# Patient Record
Sex: FEMALE | Race: WHITE | Employment: UNEMPLOYED | URBAN - METROPOLITAN AREA
[De-identification: names, ages, dates, MRNs, and addresses within clinical notes are randomized per-mention and may not be internally consistent; named-entity substitution may affect disease eponyms.]

---
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
## 2021-01-04 LAB
C TRACH DNA GENITAL QL NAA+PROBE: NORMAL
N. GONORRHOEAE DNA: NORMAL

## 2021-04-14 ENCOUNTER — APPOINTMENT (OUTPATIENT)
Dept: CT IMAGING | Age: 21
End: 2021-04-14
Payer: COMMERCIAL

## 2021-04-14 ENCOUNTER — HOSPITAL ENCOUNTER (OUTPATIENT)
Age: 21
Setting detail: OBSERVATION
Discharge: HOME | End: 2021-04-15
Attending: INTERNAL MEDICINE | Admitting: INTERNAL MEDICINE
Payer: COMMERCIAL

## 2021-04-14 DIAGNOSIS — T18.108A DISTAL ESOPHAGEAL OBSTRUCTION DUE TO FOREIGN BODY: Primary | ICD-10-CM
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
## 2021-04-14 LAB
ALBUMIN SERPL-MCNC: 4.7 G/DL (ref 3.5–5.2)
ALP BLD-CCNC: 81 U/L (ref 35–104)
ALT SERPL-CCNC: <5 U/L (ref 0–32)
AMPHETAMINE SCREEN, URINE: NOT DETECTED
ANION GAP SERPL CALCULATED.3IONS-SCNC: 9 MMOL/L (ref 7–16)
AST SERPL-CCNC: 14 U/L (ref 0–31)
BACTERIA: ABNORMAL /HPF
BARBITURATE SCREEN URINE: NOT DETECTED
BASOPHILS ABSOLUTE: 0.06 E9/L (ref 0–0.2)
BASOPHILS RELATIVE PERCENT: 0.5 % (ref 0–2)
BENZODIAZEPINE SCREEN, URINE: NOT DETECTED
BILIRUB SERPL-MCNC: <0.2 MG/DL (ref 0–1.2)
BILIRUBIN URINE: NEGATIVE
BLOOD, URINE: NEGATIVE
BUN BLDV-MCNC: 13 MG/DL (ref 6–20)
CALCIUM SERPL-MCNC: 9.9 MG/DL (ref 8.6–10.2)
CANNABINOID SCREEN URINE: POSITIVE
CHLORIDE BLD-SCNC: 106 MMOL/L (ref 98–107)
CLARITY: CLEAR
CO2: 23 MMOL/L (ref 22–29)
COCAINE METABOLITE SCREEN URINE: NOT DETECTED
COLOR: YELLOW
CREAT SERPL-MCNC: 0.7 MG/DL (ref 0.5–1)
EOSINOPHILS ABSOLUTE: 0.16 E9/L (ref 0.05–0.5)
EOSINOPHILS RELATIVE PERCENT: 1.4 % (ref 0–6)
EPITHELIAL CELLS, UA: ABNORMAL /HPF
FENTANYL SCREEN, URINE: NOT DETECTED
GFR AFRICAN AMERICAN: >60
GFR NON-AFRICAN AMERICAN: >60 ML/MIN/1.73
GLUCOSE BLD-MCNC: 86 MG/DL (ref 74–99)
GLUCOSE URINE: NEGATIVE MG/DL
HCG, URINE, POC: NEGATIVE
HCT VFR BLD CALC: 42.5 % (ref 34–48)
HEMOGLOBIN: 14.1 G/DL (ref 11.5–15.5)
IMMATURE GRANULOCYTES #: 0.06 E9/L
IMMATURE GRANULOCYTES %: 0.5 % (ref 0–5)
KETONES, URINE: NEGATIVE MG/DL
LACTIC ACID: 0.8 MMOL/L (ref 0.5–2.2)
LEUKOCYTE ESTERASE, URINE: NEGATIVE
LIPASE: 19 U/L (ref 13–60)
LYMPHOCYTES ABSOLUTE: 2.68 E9/L (ref 1.5–4)
LYMPHOCYTES RELATIVE PERCENT: 23.8 % (ref 20–42)
Lab: ABNORMAL
Lab: NORMAL
MCH RBC QN AUTO: 30 PG (ref 26–35)
MCHC RBC AUTO-ENTMCNC: 33.2 % (ref 32–34.5)
MCV RBC AUTO: 90.4 FL (ref 80–99.9)
METHADONE SCREEN, URINE: NOT DETECTED
MONOCYTES ABSOLUTE: 0.74 E9/L (ref 0.1–0.95)
MONOCYTES RELATIVE PERCENT: 6.6 % (ref 2–12)
NEGATIVE QC PASS/FAIL: NORMAL
NEUTROPHILS ABSOLUTE: 7.55 E9/L (ref 1.8–7.3)
NEUTROPHILS RELATIVE PERCENT: 67.2 % (ref 43–80)
NITRITE, URINE: NEGATIVE
OPIATE SCREEN URINE: NOT DETECTED
OXYCODONE URINE: NOT DETECTED
PDW BLD-RTO: 13.6 FL (ref 11.5–15)
PH UA: 7 (ref 5–9)
PHENCYCLIDINE SCREEN URINE: NOT DETECTED
PLATELET # BLD: 171 E9/L (ref 130–450)
PMV BLD AUTO: 13.1 FL (ref 7–12)
POSITIVE QC PASS/FAIL: NORMAL
POTASSIUM REFLEX MAGNESIUM: 4.3 MMOL/L (ref 3.5–5)
PROTEIN UA: NEGATIVE MG/DL
RBC # BLD: 4.7 E12/L (ref 3.5–5.5)
RBC UA: ABNORMAL /HPF (ref 0–2)
SODIUM BLD-SCNC: 138 MMOL/L (ref 132–146)
SPECIFIC GRAVITY UA: 1.02 (ref 1–1.03)
TOTAL PROTEIN: 7.5 G/DL (ref 6.4–8.3)
UROBILINOGEN, URINE: 0.2 E.U./DL
WBC # BLD: 11.3 E9/L (ref 4.5–11.5)
WBC UA: ABNORMAL /HPF (ref 0–5)

## 2021-04-14 PROCEDURE — 99284 EMERGENCY DEPT VISIT MOD MDM: CPT

## 2021-04-14 PROCEDURE — 83690 ASSAY OF LIPASE: CPT

## 2021-04-14 PROCEDURE — 81001 URINALYSIS AUTO W/SCOPE: CPT

## 2021-04-14 PROCEDURE — G0378 HOSPITAL OBSERVATION PER HR: HCPCS

## 2021-04-14 PROCEDURE — 96375 TX/PRO/DX INJ NEW DRUG ADDON: CPT

## 2021-04-14 PROCEDURE — 83605 ASSAY OF LACTIC ACID: CPT

## 2021-04-14 PROCEDURE — 6360000004 HC RX CONTRAST MEDICATION: Performed by: RADIOLOGY

## 2021-04-14 PROCEDURE — 99220 PR INITIAL OBSERVATION CARE/DAY 70 MINUTES: CPT | Performed by: INTERNAL MEDICINE

## 2021-04-14 PROCEDURE — 6360000002 HC RX W HCPCS: Performed by: PHYSICIAN ASSISTANT

## 2021-04-14 PROCEDURE — 85025 COMPLETE CBC W/AUTO DIFF WBC: CPT

## 2021-04-14 PROCEDURE — 96374 THER/PROPH/DIAG INJ IV PUSH: CPT

## 2021-04-14 PROCEDURE — 80307 DRUG TEST PRSMV CHEM ANLYZR: CPT

## 2021-04-14 PROCEDURE — 80053 COMPREHEN METABOLIC PANEL: CPT

## 2021-04-14 PROCEDURE — 74177 CT ABD & PELVIS W/CONTRAST: CPT

## 2021-04-14 RX ORDER — POLYETHYLENE GLYCOL 3350 17 G/17G
17 POWDER, FOR SOLUTION ORAL DAILY PRN
Status: DISCONTINUED | OUTPATIENT
Start: 2021-04-14 | End: 2021-04-15 | Stop reason: HOSPADM

## 2021-04-14 RX ORDER — SODIUM CHLORIDE 9 MG/ML
INJECTION, SOLUTION INTRAVENOUS CONTINUOUS
Status: DISCONTINUED | OUTPATIENT
Start: 2021-04-14 | End: 2021-04-15 | Stop reason: HOSPADM

## 2021-04-14 RX ORDER — ONDANSETRON 2 MG/ML
4 INJECTION INTRAMUSCULAR; INTRAVENOUS ONCE
Status: COMPLETED | OUTPATIENT
Start: 2021-04-14 | End: 2021-04-14

## 2021-04-14 RX ORDER — SODIUM CHLORIDE 0.9 % (FLUSH) 0.9 %
10 SYRINGE (ML) INJECTION EVERY 12 HOURS SCHEDULED
Status: DISCONTINUED | OUTPATIENT
Start: 2021-04-14 | End: 2021-04-15 | Stop reason: HOSPADM

## 2021-04-14 RX ORDER — MORPHINE SULFATE 2 MG/ML
1 INJECTION, SOLUTION INTRAMUSCULAR; INTRAVENOUS EVERY 4 HOURS PRN
Status: DISCONTINUED | OUTPATIENT
Start: 2021-04-14 | End: 2021-04-15 | Stop reason: HOSPADM

## 2021-04-14 RX ORDER — ACETAMINOPHEN 325 MG/1
650 TABLET ORAL EVERY 6 HOURS PRN
Status: DISCONTINUED | OUTPATIENT
Start: 2021-04-14 | End: 2021-04-15 | Stop reason: HOSPADM

## 2021-04-14 RX ORDER — SODIUM CHLORIDE 0.9 % (FLUSH) 0.9 %
10 SYRINGE (ML) INJECTION PRN
Status: DISCONTINUED | OUTPATIENT
Start: 2021-04-14 | End: 2021-04-15 | Stop reason: HOSPADM

## 2021-04-14 RX ORDER — SODIUM CHLORIDE 9 MG/ML
25 INJECTION, SOLUTION INTRAVENOUS PRN
Status: DISCONTINUED | OUTPATIENT
Start: 2021-04-14 | End: 2021-04-15 | Stop reason: HOSPADM

## 2021-04-14 RX ORDER — ACETAMINOPHEN 650 MG/1
650 SUPPOSITORY RECTAL EVERY 6 HOURS PRN
Status: DISCONTINUED | OUTPATIENT
Start: 2021-04-14 | End: 2021-04-15 | Stop reason: HOSPADM

## 2021-04-14 RX ORDER — KETOROLAC TROMETHAMINE 30 MG/ML
15 INJECTION, SOLUTION INTRAMUSCULAR; INTRAVENOUS ONCE
Status: COMPLETED | OUTPATIENT
Start: 2021-04-14 | End: 2021-04-14

## 2021-04-14 RX ADMIN — ONDANSETRON 4 MG: 2 INJECTION INTRAMUSCULAR; INTRAVENOUS at 19:59

## 2021-04-14 RX ADMIN — KETOROLAC TROMETHAMINE 15 MG: 30 INJECTION, SOLUTION INTRAMUSCULAR; INTRAVENOUS at 19:59

## 2021-04-14 RX ADMIN — IOPAMIDOL 75 ML: 755 INJECTION, SOLUTION INTRAVENOUS at 19:46

## 2021-04-14 ASSESSMENT — PAIN SCALES - GENERAL: PAINLEVEL_OUTOF10: 8

## 2021-04-14 ASSESSMENT — PAIN DESCRIPTION - ORIENTATION
ORIENTATION: MID
ORIENTATION: LEFT

## 2021-04-14 ASSESSMENT — PAIN DESCRIPTION - LOCATION
LOCATION: ABDOMEN
LOCATION: CHEST;ABDOMEN

## 2021-04-14 ASSESSMENT — PAIN DESCRIPTION - DESCRIPTORS: DESCRIPTORS: ACHING

## 2021-04-14 ASSESSMENT — PAIN DESCRIPTION - ONSET
ONSET: GRADUAL
ONSET: ON-GOING

## 2021-04-14 ASSESSMENT — PAIN DESCRIPTION - PROGRESSION: CLINICAL_PROGRESSION: NOT CHANGED

## 2021-04-14 ASSESSMENT — PAIN - FUNCTIONAL ASSESSMENT: PAIN_FUNCTIONAL_ASSESSMENT: ACTIVITIES ARE NOT PREVENTED

## 2021-04-15 VITALS
HEART RATE: 103 BPM | DIASTOLIC BLOOD PRESSURE: 55 MMHG | SYSTOLIC BLOOD PRESSURE: 109 MMHG | RESPIRATION RATE: 16 BRPM | TEMPERATURE: 98.9 F | HEIGHT: 68 IN | OXYGEN SATURATION: 97 % | WEIGHT: 156 LBS | BODY MASS INDEX: 23.64 KG/M2

## 2021-04-15 PROCEDURE — 2580000003 HC RX 258: Performed by: INTERNAL MEDICINE

## 2021-04-15 PROCEDURE — 6360000002 HC RX W HCPCS: Performed by: INTERNAL MEDICINE

## 2021-04-15 PROCEDURE — G0378 HOSPITAL OBSERVATION PER HR: HCPCS

## 2021-04-15 PROCEDURE — 96375 TX/PRO/DX INJ NEW DRUG ADDON: CPT

## 2021-04-15 RX ADMIN — SODIUM CHLORIDE: 9 INJECTION, SOLUTION INTRAVENOUS at 00:07

## 2021-04-15 RX ADMIN — MORPHINE SULFATE 1 MG: 2 INJECTION, SOLUTION INTRAMUSCULAR; INTRAVENOUS at 00:24

## 2021-04-15 RX ADMIN — Medication 10 ML: at 00:08

## 2021-04-15 ASSESSMENT — PAIN DESCRIPTION - ONSET: ONSET: ON-GOING

## 2021-04-15 NOTE — H&P
[facility] History and Physical      CHIEF COMPLAINT:  abd pain    History of Present Illness: 60-year-old female reports history of diabetes mellitus type 1 bipolar disorder and asthma but not on any medications for any of them other than medicinal marijuana. 10 days ago she started to develop epigastric pain that radiates to the substernal region and across her upper abdomen. Worse with inspiration. Has had intermittent nausea. Recently she started to cough up blood. She does report some soreness in her throat especially when she swallows. Today she had some chills. CT in the ED showed foreign body in distal esophagus. She states her symptoms started 5 minutes after she ate pizza. Informant(s) for H&P: Patient    REVIEW OF SYSTEMS:  A comprehensive 14 point review of systems was negative except for: what is in the HPI    PMH:  Past Medical History:   Diagnosis Date    Anxiety     Asthma     Bipolar 1 disorder ([facility] [address]     Depression     Diabetes mellitus ([facility] [address]        Surgical History:  No past surgical history on file. Medications Prior to Admission:    Prior to Admission medications    Medication Sig Start Date End Date Taking? Authorizing Provider   Jessica Porras 150-35 MCG/24HR  9/23/20   Historical Provider, MD   JUNEL 1/20 1-20 MG-MCG per tablet  10/5/20   Historical Provider, MD   amphetamine-dextroamphetamine (ADDERALL) 20 MG tablet Take 1 tablet by mouth daily. 7/30/19   Historical Provider, MD   escitalopram (LEXAPRO) 10 MG tablet Take 10 mg by mouth daily 7/30/19   Historical Provider, MD   guanFACINE (INTUNIV) 2 MG TB24 extended release tablet Take 2 mg by mouth daily 7/30/19   Historical Provider, MD   lisdexamfetamine (VYVANSE) 70 MG capsule Take 1 capsule by mouth daily.  7/30/19   Historical Provider, MD   melatonin 5 MG TABS tablet Take 1 tablet by mouth nightly 2/20/20   Historical Provider, MD   ondansetron (ZOFRAN-ODT) 4 MG disintegrating tablet Take 8 mg by mouth every 8 hours as needed 4/30/20   Historical Provider, MD   traZODone (DESYREL) 50 MG tablet Take 1 tablet by mouth nightly 7/30/19   Historical Provider, MD       Allergies:    Penicillins and Bee pollen    Social History:    reports that she has been smoking cigarettes. She has never used smokeless tobacco. She reports current alcohol use. She reports current drug use. Drug: Marijuana. Family History: Mother with diabetes      PHYSICAL EXAM:  Vitals:  BP (!) 144/78   Pulse 77   Temp 98.9 °F (37.2 °C) (Oral)   Resp 14   Ht 5' 8\" (1.727 m)   Wt 140 lb (63.5 kg)   LMP 03/03/2021   SpO2 98%   BMI 21.29 kg/m²   General Appearance: alert and oriented to person, place and time and in no acute distress  Skin: warm and dry, turgor not diminished  Head: normocephalic and atraumatic  Eyes: pupils equal, round, and reactive to light, extraocular eye movements intact, conjunctivae normal  Tonsils appear swollen. No exudate. No significant erythema. No area of bleeding. Neck: neck supple and non tender without mass   Pulmonary/Chest: clear to auscultation bilaterally- no wheezes, rales or rhonchi, normal air movement, no respiratory distress  Cardiovascular: normal rate, normal S1 and S2 and no M/R/R  Abdomen: soft, epigastric tenderness palpation, non-distended, normal bowel sounds, no masses or organomegaly  Extremities: no cyanosis, no clubbing and no edema  Neurologic: no cranial nerve deficit and speech normal        LABS:  Recent Labs     04/14/21  1801      K 4.3      CO2 23   BUN 13   CREATININE 0.7   GLUCOSE 86   CALCIUM 9.9       Recent Labs     04/14/21  1801   WBC 11.3   RBC 4.70   HGB 14.1   HCT 42.5   MCV 90.4   MCH 30.0   MCHC 33.2   RDW 13.6      MPV 13.1*       No results for input(s): POCGLU in the last 72 hours. Radiology:   CT ABDOMEN PELVIS W IV CONTRAST Additional Contrast? None   Final Result   1.   There appears to be thickening of the distal esophagus with a small hiatal hernia. Food product identified in the distal esophagus. (Consider upper GI endoscopy and/or upper GI esophagram for further   evaluation.)      2. No acute intra-abdominal pathology. No evidence of bowel obstruction or   inflammation. Appendix is well visualized and normal.  Mild stool burden in   the colon. No obstructive uropathy. 3.  Normal appearance of the uterus and bilateral ovaries. Probable   involuting corpus luteal cyst within the left ovary measuring 18 mm. There   is a small amount of free fluid in the pelvis which is presumably physiologic. (Screening pelvic ultrasound could be considered.)      4. Remainder of the study is as above. ASSESSMENT:      Active Problems:    Distal esophageal obstruction due to foreign body  ? Tonsillitis  Bipolar disorder  Mild intermittent asthma      PLAN:    Foreign body esophagus: GI consulted. Unable to do EGD at nighttime therefore she was referred for admission. ? Tonsillitis. Does not meet criteria for antibiotics at this time. DVT prophylaxis: Encourage ambulation      NOTE: This report was transcribed using voice recognition software. Every effort was made to ensure accuracy; however, inadvertent computerized transcription errors may be present.   Electronically signed by Bushra Connor MD on 4/14/2021 at 11:19 PM

## 2021-04-15 NOTE — PATIENT CARE CONFERENCE
P Quality Flow/Interdisciplinary Rounds Progress Note        Quality Flow Rounds held on April 15, 2021    Disciplines Attending:  ,  and Nursing Unit Leadership    Dmitriy Rojas was admitted on 4/14/2021  6:06 PM    Anticipated Discharge Date:  Expected Discharge Date: 04/17/21    Disposition:    Marvin Score:  Marvin Scale Score: 23    Readmission Risk              Risk of Unplanned Readmission:        0           Discussed patient goal for the day, patient clinical progression, and barriers to discharge.   The following Goal(s) of the Day/Commitment(s) have been identified:  Diagnostics - Report Results      Mary Razo  April 15, 2021

## 2021-04-15 NOTE — DISCHARGE SUMMARY
[facility] Physician Discharge Summary       Vickie Harris DO  [id number] [address]  [phone]            Activity level: As tolerated     Dispo: GI clinic    Condition on discharge: Stable     Patient ID:  yMla Webb  [id number]  89 y.o.  2000    Admit date: 4/14/2021    Discharge date and time:  4/15/2021  6:24 PM    Admission Diagnoses: Active Problems:    Distal esophageal obstruction due to foreign body  Resolved Problems:    * No resolved hospital problems. *      Discharge Diagnoses: Active Problems:    Distal esophageal obstruction due to foreign body  Resolved Problems:    * No resolved hospital problems. *      Consults:  IP CONSULT TO GI  IP CONSULT  Poly Pl Course:   Patient Myla Webb is a 24 y.o. presented with epigastric abdominal pain radiating to substernal region. Will admit the patient for evaluation CT scan in the ED showed foreign body in the distal esophagus, GI evaluate the patient, they plan to do an EGD unfortunately they were not able to fit the patient and today, they made arrangements for her to go to the GI office and get an EGD done over there, will discharge patient in stable condition to have the EGD done as an outpatient today. The patient was discharged before I had the chance to see her. Discharge Exam:  Please refer to the exam from last night    No intake/output data recorded. No intake/output data recorded. LABS:  Recent Labs     04/14/21  1801      K 4.3      CO2 23   BUN 13   CREATININE 0.7   GLUCOSE 86   CALCIUM 9.9       Recent Labs     04/14/21  1801   WBC 11.3   RBC 4.70   HGB 14.1   HCT 42.5   MCV 90.4   MCH 30.0   MCHC 33.2   RDW 13.6      MPV 13.1*       No results for input(s): POCGLU in the last 72 hours.     Imaging:  Ct Abdomen Pelvis W Iv Contrast Additional Contrast? None    Result Date: 4/14/2021  EXAMINATION: CT OF THE ABDOMEN AND PELVIS WITH CONTRAST 4/14/2021 7:46 pm TECHNIQUE: CT of the abdomen and pelvis was performed with the administration of intravenous contrast. Multiplanar reformatted images are provided for review. Dose modulation, iterative reconstruction, and/or weight based adjustment of the mA/kV was utilized to reduce the radiation dose to as low as reasonably achievable. COMPARISON: None used. HISTORY: ORDERING SYSTEM PROVIDED HISTORY: RLQ LLQ ab pain with hematemasis TECHNOLOGIST PROVIDED HISTORY: Reason for exam:->RLQ LLQ ab pain with hematemasis Additional Contrast?->None Decision Support Exception->Emergency Medical Condition (MA) FINDINGS: Lower Chest: Lung bases appear clear. Small hiatal hernia with residual food product identified in the distal esophagus. There appears to be mild thickening of the wall of the distal esophagus. The remaining mediastinum as imaged is unremarkable. Organs: Normal appearance of the liver, gallbladder, gallbladder fossa, extrahepatic common bile duct, pancreas, spleen, and adrenal glands. Kidneys, ureters, bladder: No definite renal mass or cyst.  Symmetric enhancement of the bilateral kidneys. No hydronephrosis. No obstructive uropathy. Normal appearance of the bladder. Vasculature: Normal appearance of the abdominal aorta and the branch vessels. No aneurysmal dilatation or atherosclerotic disease. The portal vein, splenic vein, and superior mesenteric vein appear patent. GI/Bowel: Normal appearance of the remaining stomach, small bowel, and the terminal ileum. The appendix is well visualized and normal.  Normal appearance of the large bowel. No evidence of bowel obstruction or inflammation. Mild stool burden in the colon. Pelvis: Normal appearance of the uterus. Normal appearance of the right ovary. 18 mm probable involuting cyst within the left ovary. There are no adnexal masses. Peritoneum/Retroperitoneum: Small amount of presumably physiologic free fluid in the pelvis.   No free intraperitoneal air. No concerning mesenteric nor retroperitoneal lymphadenopathy. Bones/Soft Tissues: Small fat containing umbilical hernia. Vertebral body height and alignment is maintained. 1.  There appears to be thickening of the distal esophagus with a small hiatal hernia. Food product identified in the distal esophagus. (Consider upper GI endoscopy and/or upper GI esophagram for further evaluation.) 2. No acute intra-abdominal pathology. No evidence of bowel obstruction or inflammation. Appendix is well visualized and normal.  Mild stool burden in the colon. No obstructive uropathy. 3.  Normal appearance of the uterus and bilateral ovaries. Probable involuting corpus luteal cyst within the left ovary measuring 18 mm. There is a small amount of free fluid in the pelvis which is presumably physiologic. (Screening pelvic ultrasound could be considered.) 4. Remainder of the study is as above. Patient Instructions:      Medication List      You have not been prescribed any medications.            Signed:  Electronically signed by Ethel Womack MD on 4/15/2021 at 6:24 PM

## 2021-04-15 NOTE — CARE COORDINATION
Able to arrange outpatient endoscopy at our [facility] location. Will plan to proceed with outpatient endoscopy. Above communicated to nursing.   Discharge order noted    Jm Nunez MD

## 2021-04-15 NOTE — ED PROVIDER NOTES
ED Attending  CC: Lisa      [facility]  Department of Emergency Medicine   ED  Encounter Note  Admit Date/RoomTime: 2021  6:06 PM  ED Room: 0513/0513-A    NAME: Courtney Dalton  : 2000  MRN: [id number]     Chief Complaint:  Abdominal Pain (x1 week, unsure of pregnancy )    History of Present Illness       Courtney Dalton is a 24 y.o. old female who presents to the emergency department by private vehicle, for gradual onset, persistent aching, cramping pain in the epigastrium, in the periumbilical area, in the left flank and in the right flank without radiation which began 8 day(s) prior to arrival.   There has been no similar episodes in the past.  Since onset the symptoms have been gradually worsening. The pain is associated with vomiting of all solid food, most liquids, sometimes able to keep some liquids down. .  The pain is aggravated by pressure on area of discomfort, recumbency and vomiting and relieved by nothing. There has been NO back pain, chills, cloudy urine, constipation, diarrhea, dysuria, hematuria, vaginal discharge, vaginal itching or fever. Patient's last menstrual period was 2021. Stephanie Nunez No obstetric history on file. Stephanie Nunez No prior history of sexually transmitted disease. PT reports she tried to eat chicken nugget today and vomited instantly. .  ROS   Pertinent positives and negatives are stated within HPI, all other systems reviewed and are negative. Past Medical History:  has a past medical history of Anxiety, Asthma, Bipolar 1 disorder ([address] Depression, and Diabetes mellitus ([facility] [address] Surgical History has no past surgical history on file. Social History:  reports that she has been smoking cigarettes. She has never used smokeless tobacco. She reports current alcohol use. She reports current drug use. Drug: Marijuana. Family History: family history is not on file.      Allergies: Penicillins and Bee pollen    Physical Exam   Oxygen Saturation <2.0 E.U./dL    Nitrite, Urine Negative Negative    Leukocyte Esterase, Urine Negative Negative    WBC, UA 0-1 0 - 5 /HPF    RBC, UA 0-1 0 - 2 /HPF    Epithelial Cells, UA FEW /HPF    Bacteria, UA RARE (A) None Seen /HPF   CBC auto differential   Result Value Ref Range    WBC 11.3 4.5 - 11.5 E9/L    RBC 4.70 3.50 - 5.50 E12/L    Hemoglobin 14.1 11.5 - 15.5 g/dL    Hematocrit 42.5 34.0 - 48.0 %    MCV 90.4 80.0 - 99.9 fL    MCH 30.0 26.0 - 35.0 pg    MCHC 33.2 32.0 - 34.5 %    RDW 13.6 11.5 - 15.0 fL    Platelets 809 300 - 172 E9/L    MPV 13.1 (H) 7.0 - 12.0 fL    Neutrophils % 67.2 43.0 - 80.0 %    Immature Granulocytes % 0.5 0.0 - 5.0 %    Lymphocytes % 23.8 20.0 - 42.0 %    Monocytes % 6.6 2.0 - 12.0 %    Eosinophils % 1.4 0.0 - 6.0 %    Basophils % 0.5 0.0 - 2.0 %    Neutrophils Absolute 7.55 (H) 1.80 - 7.30 E9/L    Immature Granulocytes # 0.06 E9/L    Lymphocytes Absolute 2.68 1.50 - 4.00 E9/L    Monocytes Absolute 0.74 0.10 - 0.95 E9/L    Eosinophils Absolute 0.16 0.05 - 0.50 E9/L    Basophils Absolute 0.06 0.00 - 0.20 E9/L   Comprehensive Metabolic Panel w/ Reflex to MG   Result Value Ref Range    Sodium 138 132 - 146 mmol/L    Potassium reflex Magnesium 4.3 3.5 - 5.0 mmol/L    Chloride 106 98 - 107 mmol/L    CO2 23 22 - 29 mmol/L    Anion Gap 9 7 - 16 mmol/L    Glucose 86 74 - 99 mg/dL    BUN 13 6 - 20 mg/dL    CREATININE 0.7 0.5 - 1.0 mg/dL    GFR Non-African American >60 >=60 mL/min/1.73    GFR African American >60     Calcium 9.9 8.6 - 10.2 mg/dL    Total Protein 7.5 6.4 - 8.3 g/dL    Albumin 4.7 3.5 - 5.2 g/dL    Total Bilirubin <0.2 0.0 - 1.2 mg/dL    Alkaline Phosphatase 81 35 - 104 U/L    ALT <5 0 - 32 U/L    AST 14 0 - 31 U/L   Lipase   Result Value Ref Range    Lipase 19 13 - 60 U/L   LACTIC ACID, PLASMA   Result Value Ref Range    Lactic Acid 0.8 0.5 - 2.2 mmol/L   Urine Drug Screen   Result Value Ref Range    Amphetamine Screen, Urine NOT DETECTED Negative <1000 ng/mL    Barbiturate Screen, Ur NOT DETECTED Negative < 200 ng/mL    Benzodiazepine Screen, Urine NOT DETECTED Negative < 200 ng/mL    Cannabinoid Scrn, Ur POSITIVE (A) Negative < 50ng/mL    Cocaine Metabolite Screen, Urine NOT DETECTED Negative < 300 ng/mL    Opiate Scrn, Ur NOT DETECTED Negative < 300ng/mL    PCP Screen, Urine NOT DETECTED Negative < 25 ng/mL    Methadone Screen, Urine NOT DETECTED Negative <300 ng/mL    Oxycodone Urine NOT DETECTED Negative <100 ng/mL    FENTANYL SCREEN, URINE NOT DETECTED Negative <1 ng/mL    Drug Screen Comment: see below    POC Pregnancy Urine Qual   Result Value Ref Range    HCG, Urine, POC Negative Negative    Lot Number [id number]     Positive QC Pass/Fail Acceptable     Negative QC Pass/Fail Acceptable      Imaging: All Radiology results interpreted by Radiologist unless otherwise noted. CT ABDOMEN PELVIS W IV CONTRAST Additional Contrast? None   Final Result   1. There appears to be thickening of the distal esophagus with a small   hiatal hernia. Food product identified in the distal esophagus. (Consider upper GI endoscopy and/or upper GI esophagram for further   evaluation.)      2. No acute intra-abdominal pathology. No evidence of bowel obstruction or   inflammation. Appendix is well visualized and normal.  Mild stool burden in   the colon. No obstructive uropathy. 3.  Normal appearance of the uterus and bilateral ovaries. Probable   involuting corpus luteal cyst within the left ovary measuring 18 mm. There   is a small amount of free fluid in the pelvis which is presumably physiologic. (Screening pelvic ultrasound could be considered.)      4. Remainder of the study is as above.            ED Course / Medical Decision Making     Medications   sodium chloride flush 0.9 % injection 10 mL (has no administration in time range)   0.9 % sodium chloride infusion ( Intravenous New Bag 4/15/21 0007)   sodium chloride flush 0.9 % injection 10 mL (10 mLs Intravenous Given 4/15/21 0008) Electronically signed by Alec Moser PA-C   DD: 4/15/21  **This report was transcribed using voice recognition software. Every effort was made to ensure accuracy; however, inadvertent computerized transcription errors may be present.   END OF ED PROVIDER NOTE       Alec Moser PA-C  04/15/21 1956

## 2021-08-24 ENCOUNTER — HOSPITAL ENCOUNTER (EMERGENCY)
Age: 21
Discharge: LEFT BEFORE DISCHARGE | End: 2021-08-25
Payer: COMMERCIAL

## 2021-08-24 VITALS
HEIGHT: 66 IN | HEART RATE: 99 BPM | BODY MASS INDEX: 29.73 KG/M2 | TEMPERATURE: 98 F | RESPIRATION RATE: 16 BRPM | WEIGHT: 185 LBS | DIASTOLIC BLOOD PRESSURE: 67 MMHG | SYSTOLIC BLOOD PRESSURE: 124 MMHG | OXYGEN SATURATION: 98 %

## 2021-08-24 PROCEDURE — 99282 EMERGENCY DEPT VISIT SF MDM: CPT

## 2021-08-24 PROCEDURE — 4500000002 HC ER NO CHARGE

## 2021-08-24 NOTE — ED NOTES
FIRST PROVIDER CONTACT ASSESSMENT NOTE       Department of Emergency Medicine   8/24/21  4:54 PM EDT    Chief Complaint: Abdominal Pain and Emesis    History of Present Illness:   Skye Sunshine is a 24 y.o. female who presents to the ED for lower abdominal pain and nausea with vomiting that has been ongoing for the past 3 weeks. Focused Physical Exam:  VS:  /67   Pulse 99   Temp 98 °F (36.7 °C) (Tympanic)   Resp 16   Ht 5' 6\" (1.676 m)   Wt 185 lb (83.9 kg)   SpO2 98%   BMI 29.86 kg/m²      General: Alert and in no apparent distress     Medical History:  has a past medical history of Anxiety, Asthma, Bipolar 1 disorder ([address] Depression, and Diabetes mellitus ([facility] [address] Surgical History:  has no past surgical history on file. Social History:  reports that she has been smoking cigarettes. She has never used smokeless tobacco. She reports current alcohol use. She reports current drug use. Drug: Marijuana. Family History: family history is not on file.     *ALLERGIES*     Penicillins and Bee pollen     Initial Plan of Care:  Initiate Treatment-Testing, Proceed toTreatment Area When Bed Available for ED Attending/MLP to Continue Care    -----------------END OF FIRST PROVIDER CONTACT ASSESSMENT NOTE--------------  Electronically signed by Rajani Hall PA-C   DD: 8/24/21         Rajani Hall PA-C  08/24/21 0991

## 2021-09-10 ENCOUNTER — OFFICE VISIT (OUTPATIENT)
Dept: FAMILY MEDICINE CLINIC | Age: 21
End: 2021-09-10
Payer: COMMERCIAL

## 2021-09-10 VITALS
HEART RATE: 94 BPM | WEIGHT: 185 LBS | BODY MASS INDEX: 29.73 KG/M2 | OXYGEN SATURATION: 98 % | HEIGHT: 66 IN | TEMPERATURE: 98.1 F

## 2021-09-10 DIAGNOSIS — R10.84 GENERALIZED ABDOMINAL PAIN: Primary | ICD-10-CM

## 2021-09-10 DIAGNOSIS — R11.2 NAUSEA AND VOMITING, INTRACTABILITY OF VOMITING NOT SPECIFIED, UNSPECIFIED VOMITING TYPE: ICD-10-CM

## 2021-09-10 PROCEDURE — G8419 CALC BMI OUT NRM PARAM NOF/U: HCPCS | Performed by: INTERNAL MEDICINE

## 2021-09-10 PROCEDURE — 99212 OFFICE O/P EST SF 10 MIN: CPT | Performed by: INTERNAL MEDICINE

## 2021-09-10 PROCEDURE — G8427 DOCREV CUR MEDS BY ELIG CLIN: HCPCS | Performed by: INTERNAL MEDICINE

## 2021-09-10 PROCEDURE — 4004F PT TOBACCO SCREEN RCVD TLK: CPT | Performed by: INTERNAL MEDICINE

## 2021-09-10 ASSESSMENT — ENCOUNTER SYMPTOMS
ABDOMINAL PAIN: 1
DIARRHEA: 0
COUGH: 0
SHORTNESS OF BREATH: 0
VOMITING: 1
NAUSEA: 1

## 2021-09-10 NOTE — PROGRESS NOTES
[facility]     9/10/21  Aly Flores : 2000 Sex: female  Age: 24 y.o. Chief Complaint   Patient presents with    Generalized Body Aches     hurts to move, sometimes it hurts for her to breathe when she is laying down    Nausea & Vomiting     cant keep any solid food down, cant keep soup down; can barely keep water down    Other     did go to the ER on  for vomitting with blood; they couldnt figure out what was going on with her       HPI    Patient presents to express care today accompanied by her nephew and other complaining of severe abdominal pain not been able to keep anything down. In discussing this with her this apparently has been going on for months now. Most recently worse in the past 2 months. She states nobody can figure out what is wrong with her. She was apparently in the emergency room couple weeks ago looks like she left before she was actually taken back. She says she was vomiting up blood at that time. I do not have any other details of that visit. She is extremely emotional here today. States she is having nausea and vomiting states she can barely keep water down. Denies any blood in the stool or black stool. She states she was given Zofran in the past and it made things worse. She has no family doctor. She tells me she is a diabetic and manages her self with diet. Review of Systems   Constitutional: Positive for fatigue. Negative for chills and fever. HENT: Negative. Respiratory: Negative for cough and shortness of breath. Cardiovascular: Negative for chest pain. Gastrointestinal: Positive for abdominal pain, nausea and vomiting. Negative for diarrhea. States that she has vomited blood. Endocrine:        Type II diabetic   Genitourinary: Negative for dysuria and frequency. Musculoskeletal: Negative for myalgias. Neurological: Negative for headaches. REST OF PERTINENT ROS GONE OVER AND WAS NEGATIVE. No current outpatient medications on file. Allergies   Allergen Reactions    Penicillins Anaphylaxis    Bee Pollen        Past Medical History:   Diagnosis Date    Anxiety     Asthma     Bipolar 1 disorder ([address]     Depression     Diabetes mellitus ([facility]      No past surgical history on file. No family history on file. Social History     Socioeconomic History    Marital status: Single     Spouse name: Not on file    Number of children: Not on file    Years of education: Not on file    Highest education level: Not on file   Occupational History    Not on file   Tobacco Use    Smoking status: Current Every Day Smoker     Types: Cigarettes    Smokeless tobacco: Never Used   Vaping Use    Vaping Use: Never used   Substance and Sexual Activity    Alcohol use: Yes    Drug use: Yes     Types: Marijuana    Sexual activity: Yes     Partners: Male   Other Topics Concern    Not on file   Social History Narrative    Not on file     Social Determinants of Health     Financial Resource Strain:     Difficulty of Paying Living Expenses:    Food Insecurity:     Worried About Running Out of Food in the Last Year:     [address] in the Last Year:    Transportation Needs:     Lack of Transportation (Medical):      Lack of Transportation (Non-Medical):    Physical Activity:     Days of Exercise per Week:     Minutes of Exercise per Session:    Stress:     Feeling of Stress :    Social Connections:     Frequency of Communication with Friends and Family:     Frequency of Social Gatherings with Friends and Family:     Attends [employer] Services:     Active Member of Clubs or Organizations:     Attends Club or Organization Meetings:     Marital Status:    Intimate Partner Violence:     Fear of Current or Ex-Partner:     Emotionally Abused:     Physically Abused:     Sexually Abused:        Vitals:    09/10/21 1244   Pulse: 94   Temp: 98.1 °F (36.7 °C)   TempSrc: Temporal   SpO2: 98% Weight: 185 lb (83.9 kg)   Height: 5' 6\" (1.676 m)       Physical Exam  Vitals and nursing note reviewed. Constitutional:       General: She is in acute distress. Appearance: She is not diaphoretic. Comments: Complains of severe abdominal pain   Cardiovascular:      Rate and Rhythm: Normal rate and regular rhythm. Heart sounds: No murmur heard. Pulmonary:      Effort: Pulmonary effort is normal. No respiratory distress. Breath sounds: Normal breath sounds. No wheezing, rhonchi or rales. Abdominal:      General: Bowel sounds are normal.      Palpations: Abdomen is soft. Tenderness: There is abdominal tenderness. There is guarding. Comments: Would barely let me touch her abdomen because of pain. Just barely laid hands on her and she would jump. Musculoskeletal:      Cervical back: Normal range of motion and neck supple. No tenderness. Skin:     General: Skin is warm and dry. Neurological:      Mental Status: She is alert and oriented to person, place, and time. Psychiatric:      Comments: Very emotional and inappropriate appearing. Assessment and Plan:  Nuris Carty was seen today for generalized body aches, nausea & vomiting and other. Diagnoses and all orders for this visit:    Generalized abdominal pain    Nausea and vomiting, intractability of vomiting not specified, unspecified vomiting type    I attempted to explain to patient and her friend that she needed an emergency room and not express care with her symptoms. She got up started yelling left the room and slammed the door. I did tell her friend who remained behind that is important that he gets her to the emergency room to be evaluated as she does need labs and a CAT scan and possibly scoped with her ongoing constellation of symptoms. No follow-ups on file. Seen By:  Sean France MD      *Document was created using voice recognition software.   Note was reviewed however may contain grammatical errors.

## 2021-09-24 ENCOUNTER — HOSPITAL ENCOUNTER (EMERGENCY)
Age: 21
Discharge: HOME | End: 2021-09-24
Attending: EMERGENCY MEDICINE
Payer: COMMERCIAL

## 2021-09-24 ENCOUNTER — APPOINTMENT (OUTPATIENT)
Dept: ULTRASOUND IMAGING | Age: 21
End: 2021-09-24
Payer: COMMERCIAL

## 2021-09-24 VITALS
OXYGEN SATURATION: 99 % | RESPIRATION RATE: 16 BRPM | DIASTOLIC BLOOD PRESSURE: 58 MMHG | WEIGHT: 175 LBS | BODY MASS INDEX: 27.47 KG/M2 | SYSTOLIC BLOOD PRESSURE: 109 MMHG | HEART RATE: 71 BPM | TEMPERATURE: 98.4 F | HEIGHT: 67 IN

## 2021-09-24 DIAGNOSIS — Z3A.09 9 WEEKS GESTATION OF PREGNANCY: ICD-10-CM

## 2021-09-24 DIAGNOSIS — R82.71 BACTERIA IN URINE: ICD-10-CM

## 2021-09-24 DIAGNOSIS — O46.90 VAGINAL BLEEDING IN PREGNANCY: Primary | ICD-10-CM

## 2021-09-24 LAB
ALBUMIN SERPL-MCNC: 4.7 G/DL (ref 3.5–5.2)
ALP BLD-CCNC: 50 U/L (ref 35–104)
ALT SERPL-CCNC: 9 U/L (ref 0–32)
ANION GAP SERPL CALCULATED.3IONS-SCNC: 11 MMOL/L (ref 7–16)
AST SERPL-CCNC: 9 U/L (ref 0–31)
BACTERIA: ABNORMAL /HPF
BASOPHILS ABSOLUTE: 0.04 E9/L (ref 0–0.2)
BASOPHILS RELATIVE PERCENT: 0.4 % (ref 0–2)
BILIRUB SERPL-MCNC: 0.4 MG/DL (ref 0–1.2)
BILIRUBIN URINE: NEGATIVE
BLOOD, URINE: ABNORMAL
BUN BLDV-MCNC: 8 MG/DL (ref 6–20)
CALCIUM SERPL-MCNC: 9.4 MG/DL (ref 8.6–10.2)
CHLORIDE BLD-SCNC: 101 MMOL/L (ref 98–107)
CLARITY: CLEAR
CO2: 23 MMOL/L (ref 22–29)
COLOR: YELLOW
CREAT SERPL-MCNC: 0.6 MG/DL (ref 0.5–1)
EOSINOPHILS ABSOLUTE: 0.06 E9/L (ref 0.05–0.5)
EOSINOPHILS RELATIVE PERCENT: 0.6 % (ref 0–6)
GFR AFRICAN AMERICAN: >60
GFR NON-AFRICAN AMERICAN: >60 ML/MIN/1.73
GLUCOSE BLD-MCNC: 93 MG/DL (ref 74–99)
GLUCOSE URINE: NEGATIVE MG/DL
GONADOTROPIN, CHORIONIC (HCG) QUANT: ABNORMAL MIU/ML
HCG, URINE, POC: POSITIVE
HCT VFR BLD CALC: 36.1 % (ref 34–48)
HEMOGLOBIN: 12.3 G/DL (ref 11.5–15.5)
IMMATURE GRANULOCYTES #: 0.15 E9/L
IMMATURE GRANULOCYTES %: 1.5 % (ref 0–5)
KETONES, URINE: 15 MG/DL
LACTIC ACID: 1 MMOL/L (ref 0.5–2.2)
LEUKOCYTE ESTERASE, URINE: ABNORMAL
LIPASE: 14 U/L (ref 13–60)
LYMPHOCYTES ABSOLUTE: 1.11 E9/L (ref 1.5–4)
LYMPHOCYTES RELATIVE PERCENT: 11.2 % (ref 20–42)
Lab: NORMAL
MCH RBC QN AUTO: 30.1 PG (ref 26–35)
MCHC RBC AUTO-ENTMCNC: 34.1 % (ref 32–34.5)
MCV RBC AUTO: 88.3 FL (ref 80–99.9)
MONOCYTES ABSOLUTE: 0.71 E9/L (ref 0.1–0.95)
MONOCYTES RELATIVE PERCENT: 7.2 % (ref 2–12)
NEGATIVE QC PASS/FAIL: NORMAL
NEUTROPHILS ABSOLUTE: 7.84 E9/L (ref 1.8–7.3)
NEUTROPHILS RELATIVE PERCENT: 79.1 % (ref 43–80)
NITRITE, URINE: NEGATIVE
PDW BLD-RTO: 13.1 FL (ref 11.5–15)
PH UA: 6 (ref 5–9)
PLATELET # BLD: 118 E9/L (ref 130–450)
PMV BLD AUTO: 13 FL (ref 7–12)
POSITIVE QC PASS/FAIL: NORMAL
POTASSIUM REFLEX MAGNESIUM: 3.7 MMOL/L (ref 3.5–5)
PROTEIN UA: NEGATIVE MG/DL
RBC # BLD: 4.09 E12/L (ref 3.5–5.5)
RBC UA: ABNORMAL /HPF (ref 0–2)
SODIUM BLD-SCNC: 135 MMOL/L (ref 132–146)
SPECIFIC GRAVITY UA: 1.01 (ref 1–1.03)
TOTAL PROTEIN: 7.4 G/DL (ref 6.4–8.3)
UROBILINOGEN, URINE: 0.2 E.U./DL
WBC # BLD: 9.9 E9/L (ref 4.5–11.5)
WBC UA: ABNORMAL /HPF (ref 0–5)

## 2021-09-24 PROCEDURE — 76817 TRANSVAGINAL US OBSTETRIC: CPT

## 2021-09-24 PROCEDURE — 83605 ASSAY OF LACTIC ACID: CPT

## 2021-09-24 PROCEDURE — 99285 EMERGENCY DEPT VISIT HI MDM: CPT

## 2021-09-24 PROCEDURE — 85025 COMPLETE CBC W/AUTO DIFF WBC: CPT

## 2021-09-24 PROCEDURE — 2580000003 HC RX 258: Performed by: EMERGENCY MEDICINE

## 2021-09-24 PROCEDURE — 84702 CHORIONIC GONADOTROPIN TEST: CPT

## 2021-09-24 PROCEDURE — 83690 ASSAY OF LIPASE: CPT

## 2021-09-24 PROCEDURE — 6370000000 HC RX 637 (ALT 250 FOR IP): Performed by: EMERGENCY MEDICINE

## 2021-09-24 PROCEDURE — 81001 URINALYSIS AUTO W/SCOPE: CPT

## 2021-09-24 PROCEDURE — 80053 COMPREHEN METABOLIC PANEL: CPT

## 2021-09-24 RX ORDER — CEPHALEXIN 500 MG/1
500 CAPSULE ORAL ONCE
Status: DISCONTINUED | OUTPATIENT
Start: 2021-09-24 | End: 2021-09-24 | Stop reason: HOSPADM

## 2021-09-24 RX ORDER — CEPHALEXIN 500 MG/1
500 CAPSULE ORAL 2 TIMES DAILY
Qty: 10 CAPSULE | Refills: 0 | Status: SHIPPED | OUTPATIENT
Start: 2021-09-24 | End: 2021-09-29

## 2021-09-24 RX ORDER — 0.9 % SODIUM CHLORIDE 0.9 %
1000 INTRAVENOUS SOLUTION INTRAVENOUS ONCE
Status: COMPLETED | OUTPATIENT
Start: 2021-09-24 | End: 2021-09-24

## 2021-09-24 RX ORDER — PRENATAL NO.42/FOLIC ACID 1.4 MG
1 TABLET CHEW,IMMED AND DELAY REL,BIPHASE ORAL DAILY
Qty: 30 TABLET | Refills: 0 | Status: SHIPPED | OUTPATIENT
Start: 2021-09-24 | End: 2021-10-24

## 2021-09-24 RX ORDER — ACETAMINOPHEN 500 MG
1000 TABLET ORAL ONCE
Status: COMPLETED | OUTPATIENT
Start: 2021-09-24 | End: 2021-09-24

## 2021-09-24 RX ADMIN — ACETAMINOPHEN 1000 MG: 500 TABLET ORAL at 11:09

## 2021-09-24 RX ADMIN — SODIUM CHLORIDE 1000 ML: 9 INJECTION, SOLUTION INTRAVENOUS at 11:10

## 2021-09-24 ASSESSMENT — PAIN SCALES - GENERAL
PAINLEVEL_OUTOF10: 5
PAINLEVEL_OUTOF10: 10
PAINLEVEL_OUTOF10: 0
PAINLEVEL_OUTOF10: 6

## 2021-09-24 ASSESSMENT — PAIN DESCRIPTION - FREQUENCY
FREQUENCY: CONTINUOUS
FREQUENCY: CONTINUOUS

## 2021-09-24 ASSESSMENT — PAIN DESCRIPTION - PAIN TYPE
TYPE: ACUTE PAIN
TYPE: ACUTE PAIN

## 2021-09-24 ASSESSMENT — PAIN DESCRIPTION - LOCATION
LOCATION: ABDOMEN
LOCATION: ABDOMEN;BACK

## 2021-09-24 ASSESSMENT — PAIN DESCRIPTION - DESCRIPTORS
DESCRIPTORS: CRAMPING;CONSTANT
DESCRIPTORS: CRAMPING

## 2021-09-24 NOTE — ED NOTES
This RN was outside the neighboring room speaking with RT about the pt they were going in to see when Inoticed pt Neyda Lane) yelling at staff about leaving at this time and not wanting to take ordered medication, stating, \"I have something to take at home for it. \"  Pt then asked how to get out of here, I paused my conversation with RT and explained where the ER exit was located. Pt left the department.      Jose Mercado RN  09/24/21 8427

## 2021-09-24 NOTE — ED PROVIDER NOTES
[facility]  Department of Emergency Medicine   ED  Encounter Note  Admit Date/RoomTime: 2021  9:09 AM  ED Room: CRYSTAL/CRYSTAL    NAME: Christina Lester  : 2000  MRN: [id number]     Chief Complaint:  Hematemesis (off and on for several weeks, dark red ) and Vaginal Bleeding (started today )    History of Present Illness       Christina Lester is a 24 y.o. old female who presents to the emergency department for evaluation of vaginal bleeding. She has a bizarre affect and is speaking in baby talk. She is here with her significant other who also is talking for her. She states that she has not had a period in 4 months and then began having lower pelvic cramping and bleeding today. She is unsure if she is pregnant as she states she could not afford a pregnancy test as \"they spent all their money on door dash\". She also complains of some nausea and vomiting. No diarrhea, fever or chills. She currently does not follow with a family doctor or OB/GYN. ROS   Pertinent positives and negatives are stated within HPI, all other systems reviewed and are negative. Past Medical History:  has a past medical history of Anxiety, Asthma, Bipolar 1 disorder ([address] Depression, and Diabetes mellitus ([facility] [address] Surgical History:  has no past surgical history on file. Social History:  reports that she has quit smoking. Her smoking use included cigarettes. She has never used smokeless tobacco. She reports current drug use. Drug: Marijuana. She reports that she does not drink alcohol. Family History: family history is not on file.      Allergies: Penicillins and Bee pollen    Physical Exam   Oxygen Saturation Interpretation: Normal.        ED Triage Vitals   BP Temp Temp Source Pulse Resp SpO2 Height Weight   21 0623 21 0623 21 0851 21 0623 21 0623 21 0623 21 0623 21 0949   119/72 97.1 °F (36.2 °C) Oral 87 16 99 % 5' 8\" (1.727 m) 175 lb (79.4 kg) General Appearance/Constitutional:  Alert, development consistent with age, NAD. HEENT:  NC/NT. PERRLA. Airway patent. Neck:  Supple. No lymphadenopathy. Respiratory:  Clear to auscultation and breath sounds equal.  CV:  Regular rate and rhythm. GI:  normal appearing, non-distended with no visible hernias. Bowel sounds: normal bowel sounds. Tenderness: No abdominal tenderness, guarding, rebound, rigidity or pulsatile mass. .        Liver: non-tender. Spleen:  non-tender. Back: CVA Tenderness: No CVA tenderness. : /Pelvic examination deferred / declined  Integument:  Normal turgor. Warm, dry, without visible rash, unless noted elsewhere. Lymphatics: No lymphangitis or adenopathy noted. Neurological:  Orientation age-appropriate. Motor functions intact.     Lab / Imaging Results   (All laboratory and radiology results have been personally reviewed by myself)  Labs:  Results for orders placed or performed during the hospital encounter of 09/24/21   CBC Auto Differential   Result Value Ref Range    WBC 9.9 4.5 - 11.5 E9/L    RBC 4.09 3.50 - 5.50 E12/L    Hemoglobin 12.3 11.5 - 15.5 g/dL    Hematocrit 36.1 34.0 - 48.0 %    MCV 88.3 80.0 - 99.9 fL    MCH 30.1 26.0 - 35.0 pg    MCHC 34.1 32.0 - 34.5 %    RDW 13.1 11.5 - 15.0 fL    Platelets 801 (L) 393 - 450 E9/L    MPV 13.0 (H) 7.0 - 12.0 fL    Neutrophils % 79.1 43.0 - 80.0 %    Immature Granulocytes % 1.5 0.0 - 5.0 %    Lymphocytes % 11.2 (L) 20.0 - 42.0 %    Monocytes % 7.2 2.0 - 12.0 %    Eosinophils % 0.6 0.0 - 6.0 %    Basophils % 0.4 0.0 - 2.0 %    Neutrophils Absolute 7.84 (H) 1.80 - 7.30 E9/L    Immature Granulocytes # 0.15 E9/L    Lymphocytes Absolute 1.11 (L) 1.50 - 4.00 E9/L    Monocytes Absolute 0.71 0.10 - 0.95 E9/L    Eosinophils Absolute 0.06 0.05 - 0.50 E9/L    Basophils Absolute 0.04 0.00 - 0.20 E9/L   Comprehensive Metabolic Panel w/ Reflex to MG   Result Value Ref Range    Sodium 135 132 - 146 mmol/L Potassium reflex Magnesium 3.7 3.5 - 5.0 mmol/L    Chloride 101 98 - 107 mmol/L    CO2 23 22 - 29 mmol/L    Anion Gap 11 7 - 16 mmol/L    Glucose 93 74 - 99 mg/dL    BUN 8 6 - 20 mg/dL    CREATININE 0.6 0.5 - 1.0 mg/dL    GFR Non-African American >60 >=60 mL/min/1.73    GFR African American >60     Calcium 9.4 8.6 - 10.2 mg/dL    Total Protein 7.4 6.4 - 8.3 g/dL    Albumin 4.7 3.5 - 5.2 g/dL    Total Bilirubin 0.4 0.0 - 1.2 mg/dL    Alkaline Phosphatase 50 35 - 104 U/L    ALT 9 0 - 32 U/L    AST 9 0 - 31 U/L   Lipase   Result Value Ref Range    Lipase 14 13 - 60 U/L   Lactic Acid, Plasma   Result Value Ref Range    Lactic Acid 1.0 0.5 - 2.2 mmol/L   Urinalysis, reflex to microscopic   Result Value Ref Range    Color, UA Yellow Straw/Yellow    Clarity, UA Clear Clear    Glucose, Ur Negative Negative mg/dL    Bilirubin Urine Negative Negative    Ketones, Urine 15 (A) Negative mg/dL    Specific Gravity, UA 1.015 1.005 - 1.030    Blood, Urine LARGE (A) Negative    pH, UA 6.0 5.0 - 9.0    Protein, UA Negative Negative mg/dL    Urobilinogen, Urine 0.2 <2.0 E.U./dL    Nitrite, Urine Negative Negative    Leukocyte Esterase, Urine LARGE (A) Negative   Microscopic Urinalysis   Result Value Ref Range    WBC, UA 5-10 (A) 0 - 5 /HPF    RBC, UA NONE 0 - 2 /HPF    Bacteria, UA MODERATE (A) None Seen /HPF   hCG, quantitative, pregnancy   Result Value Ref Range    hCG Quant 218378.0 (H) <10 mIU/mL   POC Pregnancy Urine   Result Value Ref Range    HCG, Urine, POC Positive Negative    Lot Number [id number]     Positive QC Pass/Fail Pass     Negative QC Pass/Fail Pass      Imaging: All Radiology results interpreted by Radiologist unless otherwise noted. US OB TRANSVAGINAL   Final Result   Single, live intrauterine pregnancy with estimated gestational age of 10 weeks   and 5 days.   Estimated delivery date is April 24, 2022           ED Course / Medical Decision Making     Medications   0.9 % sodium chloride bolus (0 mLs IntraVENous Stopped 9/24/21 1237)   acetaminophen (TYLENOL) tablet 1,000 mg (1,000 mg Oral Given 9/24/21 1109)        Re-examination:  9/24/21       Patients condition remains unchanged. Consults:   None    Procedures:   none    MDM: Patient presents to the ED for evaluation of nausea and lower abdominal cramping. Her urine POC test was positive. Sheri Cifuentes was high and so she was sent for an ultrasound. It did show a intrauterine pregnancy with fetal age of 9 weeks 5 days. Fetal heart rate 160. Patient did have bacteriuria. She had penicillin listed as an allergy -anaphylaxis. I told her I wanted to try and give her a Keflex pill here in the ED to make sure she did not have a reaction to it as it would be the safest treatment during her first semester. Patient became very agitated with myself and staff. She was in the hallway screaming and would not keep her mask on. She told another nurse that if there was something wrong with her baby she would punch it in the face. She demanded to leave and would not sign her paperwork. She stated she was going to another hospital.    Plan of Care/Counseling:  Taty Pro, DO reviewed today's visit with the patient in addition to providing specific details for the plan of care and counseling regarding the diagnosis and prognosis. Questions are answered at this time and are agreeable with the plan. Assessment      1. Vaginal bleeding in pregnancy    2. 9 weeks gestation of pregnancy    3. Bacteria in urine      Plan   Discharged home.   Patient condition is stable    New Medications     Discharge Medication List as of 9/24/2021  2:40 PM      START taking these medications    Details   cephALEXin (KEFLEX) 500 MG capsule Take 1 capsule by mouth 2 times daily for 5 days, Disp-10 capsule, R-0Print      [id number] (PRENA1) 1.4 MG CHEW Take 1 tablet by mouth daily May substitute for any other covered prenatal vitamin, Disp-30 tablet, R-0Print           Electronically signed by Patricia Das DO   DD: 9/24/21  **This report was transcribed using voice recognition software. Every effort was made to ensure accuracy; however, inadvertent computerized transcription errors may be present.   END OF ED PROVIDER NOTE        Patricia Das DO  09/24/21 2009

## 2021-09-24 NOTE — ED NOTES
Pt did not want to sign paperwork or get the ordered medication at this time and abruptly left the ED without this RN speaking with her. No paperwork or prescriptions given to pt, she refused them.      Ilia Martin RN  09/24/21 4090

## 2021-11-12 ENCOUNTER — HOSPITAL ENCOUNTER (EMERGENCY)
Age: 21
Discharge: HOME | End: 2021-11-12
Attending: EMERGENCY MEDICINE
Payer: COMMERCIAL

## 2021-11-12 VITALS
OXYGEN SATURATION: 98 % | HEIGHT: 67 IN | TEMPERATURE: 98.2 F | HEART RATE: 68 BPM | RESPIRATION RATE: 16 BRPM | BODY MASS INDEX: 29.03 KG/M2 | DIASTOLIC BLOOD PRESSURE: 62 MMHG | SYSTOLIC BLOOD PRESSURE: 120 MMHG | WEIGHT: 185 LBS

## 2021-11-12 DIAGNOSIS — R07.9 CHEST PAIN, UNSPECIFIED TYPE: Primary | ICD-10-CM

## 2021-11-12 DIAGNOSIS — O26.899 ABDOMINAL PAIN DURING INTRAUTERINE PREGNANCY: ICD-10-CM

## 2021-11-12 DIAGNOSIS — R10.9 ABDOMINAL PAIN DURING INTRAUTERINE PREGNANCY: ICD-10-CM

## 2021-11-12 LAB
BACTERIA: ABNORMAL /HPF
BILIRUBIN URINE: NEGATIVE
BLOOD, URINE: ABNORMAL
CLARITY: CLEAR
COLOR: YELLOW
GLUCOSE URINE: NEGATIVE MG/DL
KETONES, URINE: NEGATIVE MG/DL
LEUKOCYTE ESTERASE, URINE: ABNORMAL
NITRITE, URINE: NEGATIVE
PH UA: 7 (ref 5–9)
PROTEIN UA: NEGATIVE MG/DL
RBC UA: ABNORMAL /HPF (ref 0–2)
SPECIFIC GRAVITY UA: 1.01 (ref 1–1.03)
UROBILINOGEN, URINE: 0.2 E.U./DL
WBC UA: ABNORMAL /HPF (ref 0–5)

## 2021-11-12 PROCEDURE — 81001 URINALYSIS AUTO W/SCOPE: CPT

## 2021-11-12 PROCEDURE — 99285 EMERGENCY DEPT VISIT HI MDM: CPT

## 2021-11-12 PROCEDURE — 93005 ELECTROCARDIOGRAM TRACING: CPT | Performed by: STUDENT IN AN ORGANIZED HEALTH CARE EDUCATION/TRAINING PROGRAM

## 2021-11-12 PROCEDURE — 2580000003 HC RX 258: Performed by: EMERGENCY MEDICINE

## 2021-11-12 PROCEDURE — 87088 URINE BACTERIA CULTURE: CPT

## 2021-11-12 RX ORDER — 0.9 % SODIUM CHLORIDE 0.9 %
1000 INTRAVENOUS SOLUTION INTRAVENOUS ONCE
Status: COMPLETED | OUTPATIENT
Start: 2021-11-12 | End: 2021-11-12

## 2021-11-12 RX ORDER — CEPHALEXIN 500 MG/1
500 CAPSULE ORAL 4 TIMES DAILY
Qty: 28 CAPSULE | Refills: 0 | Status: SHIPPED | OUTPATIENT
Start: 2021-11-12 | End: 2021-11-19

## 2021-11-12 RX ORDER — CEFDINIR 300 MG/1
300 CAPSULE ORAL 2 TIMES DAILY
Qty: 14 CAPSULE | Refills: 0 | Status: SHIPPED | OUTPATIENT
Start: 2021-11-12 | End: 2021-11-12

## 2021-11-12 RX ADMIN — SODIUM CHLORIDE 1000 ML: 9 INJECTION, SOLUTION INTRAVENOUS at 21:24

## 2021-11-12 ASSESSMENT — ENCOUNTER SYMPTOMS
TROUBLE SWALLOWING: 0
NAUSEA: 0
SINUS PAIN: 0
DIARRHEA: 0
COUGH: 0
VOICE CHANGE: 0
SHORTNESS OF BREATH: 1
ABDOMINAL PAIN: 1
VOMITING: 0
PHOTOPHOBIA: 0

## 2021-11-12 ASSESSMENT — PAIN DESCRIPTION - LOCATION: LOCATION: ABDOMEN

## 2021-11-12 ASSESSMENT — PAIN SCALES - GENERAL: PAINLEVEL_OUTOF10: 2

## 2021-11-12 ASSESSMENT — PAIN DESCRIPTION - PAIN TYPE: TYPE: ACUTE PAIN

## 2021-11-13 LAB
EKG ATRIAL RATE: 88 BPM
EKG P AXIS: 33 DEGREES
EKG P-R INTERVAL: 132 MS
EKG Q-T INTERVAL: 336 MS
EKG QRS DURATION: 84 MS
EKG QTC CALCULATION (BAZETT): 406 MS
EKG R AXIS: 51 DEGREES
EKG T AXIS: 39 DEGREES
EKG VENTRICULAR RATE: 88 BPM

## 2021-11-13 PROCEDURE — 93010 ELECTROCARDIOGRAM REPORT: CPT | Performed by: INTERNAL MEDICINE
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
## 2021-11-15 LAB — URINE CULTURE, ROUTINE: NORMAL

## 2024-07-07 ENCOUNTER — APPOINTMENT (OUTPATIENT)
Dept: RADIOLOGY | Facility: HOSPITAL | Age: 24
End: 2024-07-07
Payer: COMMERCIAL

## 2024-07-07 ENCOUNTER — HOSPITAL ENCOUNTER (OUTPATIENT)
Facility: HOSPITAL | Age: 24
Setting detail: OBSERVATION
End: 2024-07-07
Attending: EMERGENCY MEDICINE | Admitting: STUDENT IN AN ORGANIZED HEALTH CARE EDUCATION/TRAINING PROGRAM
Payer: COMMERCIAL

## 2024-07-07 VITALS
BODY MASS INDEX: 25.12 KG/M2 | TEMPERATURE: 98.6 F | DIASTOLIC BLOOD PRESSURE: 65 MMHG | OXYGEN SATURATION: 99 % | HEIGHT: 67 IN | HEART RATE: 88 BPM | WEIGHT: 160.05 LBS | SYSTOLIC BLOOD PRESSURE: 101 MMHG | RESPIRATION RATE: 16 BRPM

## 2024-07-07 DIAGNOSIS — R53.1 RIGHT SIDED WEAKNESS: ICD-10-CM

## 2024-07-07 DIAGNOSIS — G40.919 BREAKTHROUGH SEIZURE (MULTI): Primary | ICD-10-CM

## 2024-07-07 DIAGNOSIS — Z91.199 CURRENT NON-ADHERENCE TO MEDICAL TREATMENT: ICD-10-CM

## 2024-07-07 DIAGNOSIS — R56.9 SEIZURE (MULTI): ICD-10-CM

## 2024-07-07 LAB
ALBUMIN SERPL BCP-MCNC: 4.4 G/DL (ref 3.4–5)
ALP SERPL-CCNC: 42 U/L (ref 33–110)
ALT SERPL W P-5'-P-CCNC: 10 U/L (ref 7–45)
ANION GAP SERPL CALC-SCNC: 14 MMOL/L (ref 10–20)
AST SERPL W P-5'-P-CCNC: 8 U/L (ref 9–39)
B-HCG SERPL-ACNC: <2 MIU/ML
BASOPHILS # BLD AUTO: 0.03 X10*3/UL (ref 0–0.1)
BASOPHILS NFR BLD AUTO: 0.5 %
BILIRUB SERPL-MCNC: 0.5 MG/DL (ref 0–1.2)
BUN SERPL-MCNC: 20 MG/DL (ref 6–23)
CALCIUM SERPL-MCNC: 9.4 MG/DL (ref 8.6–10.3)
CARDIAC TROPONIN I PNL SERPL HS: <3 NG/L (ref 0–13)
CARDIAC TROPONIN I PNL SERPL HS: <3 NG/L (ref 0–13)
CHLORIDE SERPL-SCNC: 111 MMOL/L (ref 98–107)
CO2 SERPL-SCNC: 19 MMOL/L (ref 21–32)
CREAT SERPL-MCNC: 0.79 MG/DL (ref 0.5–1.05)
EGFRCR SERPLBLD CKD-EPI 2021: >90 ML/MIN/1.73M*2
EOSINOPHIL # BLD AUTO: 0.1 X10*3/UL (ref 0–0.7)
EOSINOPHIL NFR BLD AUTO: 1.5 %
ERYTHROCYTE [DISTWIDTH] IN BLOOD BY AUTOMATED COUNT: 13.2 % (ref 11.5–14.5)
ETHANOL SERPL-MCNC: <10 MG/DL
GLUCOSE SERPL-MCNC: 101 MG/DL (ref 74–99)
HCT VFR BLD AUTO: 35.2 % (ref 36–46)
HGB BLD-MCNC: 12.3 G/DL (ref 12–16)
IMM GRANULOCYTES # BLD AUTO: 0.02 X10*3/UL (ref 0–0.7)
IMM GRANULOCYTES NFR BLD AUTO: 0.3 % (ref 0–0.9)
INR PPP: 1.2 (ref 0.9–1.1)
LACTATE SERPL-SCNC: 1 MMOL/L (ref 0.4–2)
LYMPHOCYTES # BLD AUTO: 2.39 X10*3/UL (ref 1.2–4.8)
LYMPHOCYTES NFR BLD AUTO: 36.7 %
MAGNESIUM SERPL-MCNC: 1.98 MG/DL (ref 1.6–2.4)
MCH RBC QN AUTO: 30.9 PG (ref 26–34)
MCHC RBC AUTO-ENTMCNC: 34.9 G/DL (ref 32–36)
MCV RBC AUTO: 88 FL (ref 80–100)
MONOCYTES # BLD AUTO: 0.52 X10*3/UL (ref 0.1–1)
MONOCYTES NFR BLD AUTO: 8 %
NEUTROPHILS # BLD AUTO: 3.46 X10*3/UL (ref 1.2–7.7)
NEUTROPHILS NFR BLD AUTO: 53 %
NRBC BLD-RTO: 0 /100 WBCS (ref 0–0)
PLATELET # BLD AUTO: 160 X10*3/UL (ref 150–450)
POTASSIUM SERPL-SCNC: 3.6 MMOL/L (ref 3.5–5.3)
PROT SERPL-MCNC: 7.3 G/DL (ref 6.4–8.2)
PROTHROMBIN TIME: 13.2 SECONDS (ref 9.8–12.8)
RBC # BLD AUTO: 3.98 X10*6/UL (ref 4–5.2)
SODIUM SERPL-SCNC: 140 MMOL/L (ref 136–145)
WBC # BLD AUTO: 6.5 X10*3/UL (ref 4.4–11.3)

## 2024-07-07 PROCEDURE — 2500000004 HC RX 250 GENERAL PHARMACY W/ HCPCS (ALT 636 FOR OP/ED): Performed by: EMERGENCY MEDICINE

## 2024-07-07 PROCEDURE — 85025 COMPLETE CBC W/AUTO DIFF WBC: CPT | Performed by: EMERGENCY MEDICINE

## 2024-07-07 PROCEDURE — 84075 ASSAY ALKALINE PHOSPHATASE: CPT | Performed by: EMERGENCY MEDICINE

## 2024-07-07 PROCEDURE — 2500000001 HC RX 250 WO HCPCS SELF ADMINISTERED DRUGS (ALT 637 FOR MEDICARE OP): Performed by: STUDENT IN AN ORGANIZED HEALTH CARE EDUCATION/TRAINING PROGRAM

## 2024-07-07 PROCEDURE — 99285 EMERGENCY DEPT VISIT HI MDM: CPT | Mod: 25

## 2024-07-07 PROCEDURE — 83735 ASSAY OF MAGNESIUM: CPT | Performed by: EMERGENCY MEDICINE

## 2024-07-07 PROCEDURE — 84702 CHORIONIC GONADOTROPIN TEST: CPT | Performed by: EMERGENCY MEDICINE

## 2024-07-07 PROCEDURE — 36415 COLL VENOUS BLD VENIPUNCTURE: CPT | Performed by: EMERGENCY MEDICINE

## 2024-07-07 PROCEDURE — G0378 HOSPITAL OBSERVATION PER HR: HCPCS

## 2024-07-07 PROCEDURE — 2500000005 HC RX 250 GENERAL PHARMACY W/O HCPCS: Performed by: EMERGENCY MEDICINE

## 2024-07-07 PROCEDURE — 80235 DRUG ASSAY LACOSAMIDE: CPT | Performed by: EMERGENCY MEDICINE

## 2024-07-07 PROCEDURE — 85610 PROTHROMBIN TIME: CPT | Performed by: EMERGENCY MEDICINE

## 2024-07-07 PROCEDURE — 96360 HYDRATION IV INFUSION INIT: CPT

## 2024-07-07 PROCEDURE — 84484 ASSAY OF TROPONIN QUANT: CPT | Performed by: EMERGENCY MEDICINE

## 2024-07-07 PROCEDURE — 83605 ASSAY OF LACTIC ACID: CPT | Performed by: EMERGENCY MEDICINE

## 2024-07-07 PROCEDURE — 70450 CT HEAD/BRAIN W/O DYE: CPT

## 2024-07-07 PROCEDURE — 70450 CT HEAD/BRAIN W/O DYE: CPT | Performed by: RADIOLOGY

## 2024-07-07 PROCEDURE — 72125 CT NECK SPINE W/O DYE: CPT

## 2024-07-07 PROCEDURE — 99222 1ST HOSP IP/OBS MODERATE 55: CPT | Performed by: STUDENT IN AN ORGANIZED HEALTH CARE EDUCATION/TRAINING PROGRAM

## 2024-07-07 PROCEDURE — 2500000001 HC RX 250 WO HCPCS SELF ADMINISTERED DRUGS (ALT 637 FOR MEDICARE OP): Performed by: EMERGENCY MEDICINE

## 2024-07-07 PROCEDURE — 96361 HYDRATE IV INFUSION ADD-ON: CPT

## 2024-07-07 PROCEDURE — 2500000004 HC RX 250 GENERAL PHARMACY W/ HCPCS (ALT 636 FOR OP/ED): Performed by: STUDENT IN AN ORGANIZED HEALTH CARE EDUCATION/TRAINING PROGRAM

## 2024-07-07 PROCEDURE — 72125 CT NECK SPINE W/O DYE: CPT | Performed by: RADIOLOGY

## 2024-07-07 PROCEDURE — 82077 ASSAY SPEC XCP UR&BREATH IA: CPT | Performed by: EMERGENCY MEDICINE

## 2024-07-07 RX ORDER — ONDANSETRON HYDROCHLORIDE 2 MG/ML
4 INJECTION, SOLUTION INTRAVENOUS EVERY 8 HOURS PRN
Status: DISCONTINUED | OUTPATIENT
Start: 2024-07-07 | End: 2024-07-08 | Stop reason: HOSPADM

## 2024-07-07 RX ORDER — ACETAMINOPHEN 650 MG/1
650 SUPPOSITORY RECTAL EVERY 4 HOURS PRN
Status: DISCONTINUED | OUTPATIENT
Start: 2024-07-07 | End: 2024-07-08 | Stop reason: HOSPADM

## 2024-07-07 RX ORDER — PANTOPRAZOLE SODIUM 40 MG/1
40 TABLET, DELAYED RELEASE ORAL DAILY PRN
Status: DISCONTINUED | OUTPATIENT
Start: 2024-07-07 | End: 2024-07-08 | Stop reason: HOSPADM

## 2024-07-07 RX ORDER — SODIUM CHLORIDE, SODIUM LACTATE, POTASSIUM CHLORIDE, CALCIUM CHLORIDE 600; 310; 30; 20 MG/100ML; MG/100ML; MG/100ML; MG/100ML
125 INJECTION, SOLUTION INTRAVENOUS CONTINUOUS
Status: DISCONTINUED | OUTPATIENT
Start: 2024-07-07 | End: 2024-07-08 | Stop reason: HOSPADM

## 2024-07-07 RX ORDER — PANTOPRAZOLE SODIUM 40 MG/10ML
40 INJECTION, POWDER, LYOPHILIZED, FOR SOLUTION INTRAVENOUS DAILY PRN
Status: DISCONTINUED | OUTPATIENT
Start: 2024-07-07 | End: 2024-07-08 | Stop reason: HOSPADM

## 2024-07-07 RX ORDER — LACOSAMIDE 100 MG/1
100 TABLET ORAL 2 TIMES DAILY
Status: ON HOLD | COMMUNITY
End: 2024-07-08

## 2024-07-07 RX ORDER — LIDOCAINE 50 MG/G
1 PATCH TOPICAL DAILY
COMMUNITY

## 2024-07-07 RX ORDER — ACETAMINOPHEN 325 MG/1
650 TABLET ORAL EVERY 4 HOURS PRN
Status: DISCONTINUED | OUTPATIENT
Start: 2024-07-07 | End: 2024-07-08 | Stop reason: HOSPADM

## 2024-07-07 RX ORDER — ACETAMINOPHEN 160 MG/5ML
650 SOLUTION ORAL EVERY 4 HOURS PRN
Status: DISCONTINUED | OUTPATIENT
Start: 2024-07-07 | End: 2024-07-08 | Stop reason: HOSPADM

## 2024-07-07 RX ORDER — BISACODYL 5 MG
10 TABLET, DELAYED RELEASE (ENTERIC COATED) ORAL DAILY PRN
Status: DISCONTINUED | OUTPATIENT
Start: 2024-07-07 | End: 2024-07-08 | Stop reason: HOSPADM

## 2024-07-07 RX ORDER — LACOSAMIDE 100 MG/1
100 TABLET ORAL ONCE
Status: COMPLETED | OUTPATIENT
Start: 2024-07-07 | End: 2024-07-07

## 2024-07-07 RX ORDER — QUETIAPINE FUMARATE 100 MG/1
100 TABLET, FILM COATED ORAL NIGHTLY
COMMUNITY

## 2024-07-07 RX ORDER — ONDANSETRON 4 MG/1
4 TABLET, FILM COATED ORAL EVERY 8 HOURS PRN
Status: DISCONTINUED | OUTPATIENT
Start: 2024-07-07 | End: 2024-07-08 | Stop reason: HOSPADM

## 2024-07-07 RX ORDER — GUAIFENESIN 600 MG/1
600 TABLET, EXTENDED RELEASE ORAL EVERY 12 HOURS PRN
Status: DISCONTINUED | OUTPATIENT
Start: 2024-07-07 | End: 2024-07-08 | Stop reason: HOSPADM

## 2024-07-07 RX ORDER — GUAIFENESIN/DEXTROMETHORPHAN 100-10MG/5
5 SYRUP ORAL EVERY 4 HOURS PRN
Status: DISCONTINUED | OUTPATIENT
Start: 2024-07-07 | End: 2024-07-08 | Stop reason: HOSPADM

## 2024-07-07 RX ORDER — LACOSAMIDE 100 MG/1
100 TABLET ORAL EVERY 12 HOURS SCHEDULED
Status: DISCONTINUED | OUTPATIENT
Start: 2024-07-07 | End: 2024-07-08 | Stop reason: HOSPADM

## 2024-07-07 RX ADMIN — SODIUM CHLORIDE 1000 ML: 9 INJECTION, SOLUTION INTRAVENOUS at 12:28

## 2024-07-07 RX ADMIN — LACOSAMIDE 100 MG: 100 TABLET, FILM COATED ORAL at 15:04

## 2024-07-07 RX ADMIN — Medication 2 L/MIN: at 12:25

## 2024-07-07 RX ADMIN — SODIUM CHLORIDE, POTASSIUM CHLORIDE, SODIUM LACTATE AND CALCIUM CHLORIDE 125 ML/HR: 600; 310; 30; 20 INJECTION, SOLUTION INTRAVENOUS at 18:48

## 2024-07-07 RX ADMIN — LACOSAMIDE 100 MG: 100 TABLET, FILM COATED ORAL at 20:24

## 2024-07-07 SDOH — SOCIAL STABILITY: SOCIAL INSECURITY: ARE YOU OR HAVE YOU BEEN THREATENED OR ABUSED PHYSICALLY, EMOTIONALLY, OR SEXUALLY BY ANYONE?: YES

## 2024-07-07 SDOH — SOCIAL STABILITY: SOCIAL INSECURITY: DOES ANYONE TRY TO KEEP YOU FROM HAVING/CONTACTING OTHER FRIENDS OR DOING THINGS OUTSIDE YOUR HOME?: NO

## 2024-07-07 SDOH — SOCIAL STABILITY: SOCIAL INSECURITY: HAS ANYONE EVER THREATENED TO HURT YOUR FAMILY OR YOUR PETS?: NO

## 2024-07-07 SDOH — SOCIAL STABILITY: SOCIAL INSECURITY: HAVE YOU HAD THOUGHTS OF HARMING ANYONE ELSE?: NO

## 2024-07-07 SDOH — SOCIAL STABILITY: SOCIAL INSECURITY: ARE THERE ANY APPARENT SIGNS OF INJURIES/BEHAVIORS THAT COULD BE RELATED TO ABUSE/NEGLECT?: NO

## 2024-07-07 SDOH — SOCIAL STABILITY: SOCIAL INSECURITY: ABUSE: ADULT

## 2024-07-07 SDOH — SOCIAL STABILITY: SOCIAL INSECURITY: WERE YOU ABLE TO COMPLETE ALL THE BEHAVIORAL HEALTH SCREENINGS?: YES

## 2024-07-07 SDOH — SOCIAL STABILITY: SOCIAL INSECURITY: DO YOU FEEL UNSAFE GOING BACK TO THE PLACE WHERE YOU ARE LIVING?: NO

## 2024-07-07 SDOH — SOCIAL STABILITY: SOCIAL INSECURITY: HAVE YOU HAD ANY THOUGHTS OF HARMING ANYONE ELSE?: NO

## 2024-07-07 SDOH — SOCIAL STABILITY: SOCIAL INSECURITY: POSSIBLE ABUSE REPORTED TO:: OTHER (COMMENT)

## 2024-07-07 SDOH — HEALTH STABILITY: MENTAL HEALTH: EXPERIENCED ANY OF THE FOLLOWING LIFE EVENTS: SEXUAL ASSAULT;LIFE-THREATENING ILLNESS OR INJURY

## 2024-07-07 SDOH — SOCIAL STABILITY: SOCIAL INSECURITY: DO YOU FEEL ANYONE HAS EXPLOITED OR TAKEN ADVANTAGE OF YOU FINANCIALLY OR OF YOUR PERSONAL PROPERTY?: NO

## 2024-07-07 ASSESSMENT — LIFESTYLE VARIABLES
TOTAL SCORE: 0
AUDIT-C TOTAL SCORE: 0
AUDIT-C TOTAL SCORE: 0
HAVE PEOPLE ANNOYED YOU BY CRITICIZING YOUR DRINKING: NO
PRESCIPTION_ABUSE_PAST_12_MONTHS: NO
HOW OFTEN DO YOU HAVE 6 OR MORE DRINKS ON ONE OCCASION: NEVER
SUBSTANCE_ABUSE_PAST_12_MONTHS: YES
HOW OFTEN DO YOU HAVE A DRINK CONTAINING ALCOHOL: NEVER
EVER FELT BAD OR GUILTY ABOUT YOUR DRINKING: NO
HOW MANY STANDARD DRINKS CONTAINING ALCOHOL DO YOU HAVE ON A TYPICAL DAY: PATIENT DOES NOT DRINK
HAVE YOU EVER FELT YOU SHOULD CUT DOWN ON YOUR DRINKING: NO
EVER HAD A DRINK FIRST THING IN THE MORNING TO STEADY YOUR NERVES TO GET RID OF A HANGOVER: NO
SKIP TO QUESTIONS 9-10: 1

## 2024-07-07 ASSESSMENT — PATIENT HEALTH QUESTIONNAIRE - PHQ9
1. LITTLE INTEREST OR PLEASURE IN DOING THINGS: NOT AT ALL
2. FEELING DOWN, DEPRESSED OR HOPELESS: NOT AT ALL
SUM OF ALL RESPONSES TO PHQ9 QUESTIONS 1 & 2: 0

## 2024-07-07 ASSESSMENT — COGNITIVE AND FUNCTIONAL STATUS - GENERAL
MOBILITY SCORE: 24
DAILY ACTIVITIY SCORE: 24
DAILY ACTIVITIY SCORE: 24
MOBILITY SCORE: 24
PATIENT BASELINE BEDBOUND: NO

## 2024-07-07 ASSESSMENT — ACTIVITIES OF DAILY LIVING (ADL)
PATIENT'S MEMORY ADEQUATE TO SAFELY COMPLETE DAILY ACTIVITIES?: YES
GROOMING: INDEPENDENT
HEARING - RIGHT EAR: FUNCTIONAL
LACK_OF_TRANSPORTATION: YES
JUDGMENT_ADEQUATE_SAFELY_COMPLETE_DAILY_ACTIVITIES: YES
WALKS IN HOME: INDEPENDENT
DRESSING YOURSELF: INDEPENDENT
ADEQUATE_TO_COMPLETE_ADL: YES
HEARING - LEFT EAR: FUNCTIONAL
TOILETING: INDEPENDENT
FEEDING YOURSELF: INDEPENDENT
BATHING: INDEPENDENT

## 2024-07-07 ASSESSMENT — PAIN SCALES - GENERAL
PAINLEVEL_OUTOF10: 0 - NO PAIN
PAINLEVEL_OUTOF10: 0 - NO PAIN

## 2024-07-07 ASSESSMENT — PAIN - FUNCTIONAL ASSESSMENT
PAIN_FUNCTIONAL_ASSESSMENT: 0-10
PAIN_FUNCTIONAL_ASSESSMENT: 0-10

## 2024-07-07 NOTE — H&P
History Of Present Illness  Prasad Breen is a 23 y.o. female presenting with multiple episodes of seizure.  Patient ran out of her seizure medication Vimpat.  In ED, CT head is negative for acute findings, CT cervical spine is also negative for acute findings.  Patient is admitted for further evaluation and observation.  Patient also reported right-sided weakness.    Labs showed a bicarbonate of 19 lactic acid was not checked    Past medical history include seizure, patient has defibrillator placed in January of this year in [facility].  She stated that she had a cardiac arrest due to seizures and defibrillator was placed in.    Social history she smokes but no drugs  no alcohol         Past Medical History  Past Medical History:   Diagnosis Date    Pacemaker     Seizure (Multi)        Surgical History  History reviewed. No pertinent surgical history.     Social History  She reports that she has never smoked. She has never used smokeless tobacco. She reports current drug use. Drug: Marijuana. No history on file for alcohol use.    Family History  No family history on file.     Allergies  Amoxicillin, Iodinated contrast media, and Penicillins    Review of Systems   All other systems reviewed and are negative.       Physical Exam  Constitutional:       Appearance: Normal appearance.   HENT:      Head: Normocephalic and atraumatic.      Nose: Nose normal.   Eyes:      Extraocular Movements: Extraocular movements intact.      Pupils: Pupils are equal, round, and reactive to light.   Cardiovascular:      Rate and Rhythm: Normal rate and regular rhythm.      Heart sounds: Normal heart sounds.   Pulmonary:      Effort: Pulmonary effort is normal.      Breath sounds: Normal breath sounds.   Abdominal:      General: Bowel sounds are normal.      Palpations: Abdomen is soft.   Musculoskeletal:      Cervical back: Neck supple.   Skin:     General: Skin is warm and dry.   Neurological:      General: No focal deficit  "present.      Mental Status: She is alert. Mental status is at baseline.   Psychiatric:         Mood and Affect: Mood normal.          Last Recorded Vitals  Blood pressure 97/55, pulse 80, temperature 36.9 °C (98.4 °F), temperature source Temporal, resp. rate 15, height 1.702 m (5' 7\"), weight 65.8 kg (145 lb), SpO2 96%.    Relevant Results             Assessment/Plan   Principal Problem:    Seizure (Multi)  Problem list    Seizure  Medical noncompliance  Right-sided weakness  Metabolic acidosis    Treatment plan    Admitted for observation.  Continue Vimpat 100 mg twice a day.  Consult neurology.  LR IV fluid.  Admitted for observation.  Appropriate DVT prophylaxis.  Replace potassium.  Check magnesium.           Willie Vega MD    "

## 2024-07-07 NOTE — ED PROVIDER NOTES
23-year-old female presents emergency department via EMS with report of seizure-like activity.  Patient reports she has history of seizures and has about 2 seizures a week which is an improvement from her previous history.  She states she has been without her lacosamide 100 mg twice daily since the week ago Friday.  Patient reports her last seizure was 2 days ago.  EMS reported they feel she had multiple seizures and route and was given 2.5 mg IV Versed with improvement of her seizure.  EMS reported her seizures were full tonic-clonic.  Patient denies any fevers, coughing, or congestion.  She is sleepy on exam and states she feels generally weak.  No report of head injury or trauma.  Patient states she can feel her seizures come on and usually is able to sit down.  She denies headache or neck pain.  She does report some chest tightness that she states is normal with her seizures.  Patient tells me that she has a pacer/defibrillator and it often will fire when she has seizures.  No abdominal pain, nausea, vomiting, dysuria, diarrhea, constipation, black or bloody stools.  Patient reports she is not sure if she is pregnant.  She admits to tobacco use and marijuana use.  She denies any other illicit drug use or regular alcohol use.  Patient states she has not taken her medications because she ran out of them and has not been able to get a refill.  EMS reported that the patient was homeless. Patient reports history of seizure disorder and pacer/defibrillator.      History provided by:  Patient and EMS personnel   used: No           ------------------------------------------------------------------------------------------------------------------------------------------    VS: As documented in the triage note and EMR flowsheet from this visit were reviewed.    Review of Systems  Constitutional: no fever, chills reports fatigue  Eyes: no redness, discharge, pain  HENT: no sore throat, nose bleeds,  congestion, rhinorrhea   Cardiovascular: Admits to chest tightness no leg edema, palpitations  Respiratory: no shortness of breath, cough, wheezing  GI: no nausea, diarrhea, pain, vomiting, constipation, BRBPR, melena  : no dysuria, frequency, hematuria  Musculoskeletal: no neck pain, stiffness,  no joint deformity, swelling  Skin: no rash, erythema, wounds  Neurological: no headache, dizziness, lightheadedness, patient appreciated to have right-sided weakness and numbness tingling.  She does report seizures  Psychiatric: no suicidal thoughts, confusion, agitation  Metabolic: no polyuria or polydipsia  Hematologic: no increased bleeding or bruising  Immunology: No immunocompromise state    [facility]  Nursing notes reviewed and confirmed by me.  Chart review performed including medications, allergies, and medical, surgical, and family history  Visit Vitals  /61 (BP Location: Right arm, Patient Position: Lying)   Pulse 94   Temp 36.5 °C (97.7 °F)   Resp 15     Physical Exam  Vitals and nursing note reviewed.   Constitutional:       General: She is not in acute distress.     Appearance: Normal appearance. She is not ill-appearing.      Comments: Patient is very sleepy on my exam, but awakes easily to voice and answers questions appropriately.   HENT:      Head: Normocephalic and atraumatic.      Comments: No Don sign or raccoon eyes.     Right Ear: External ear normal.      Left Ear: External ear normal.      Nose: Nose normal. No congestion or rhinorrhea.      Mouth/Throat:      Mouth: Mucous membranes are dry.      Pharynx: No oropharyngeal exudate or posterior oropharyngeal erythema.   Eyes:      Extraocular Movements: Extraocular movements intact.      Conjunctiva/sclera: Conjunctivae normal.      Pupils: Pupils are equal, round, and reactive to light.   Neck:      Comments: No midline neck tenderness, step-offs, or deformities.  Cardiovascular:      Rate and Rhythm: Normal rate and regular rhythm.       Pulses: Normal pulses.      Heart sounds: Normal heart sounds.   Pulmonary:      Effort: Pulmonary effort is normal. No respiratory distress.      Breath sounds: Normal breath sounds. No stridor. No wheezing, rhonchi or rales.   Abdominal:      General: There is no distension.      Palpations: Abdomen is soft.      Tenderness: There is no abdominal tenderness. There is no guarding or rebound.   Musculoskeletal:         General: No swelling or deformity.      Cervical back: Neck supple.      Right lower leg: No edema.      Left lower leg: No edema.      Comments: No calf tenderness   Pelvis is stable.  No midline back tenderness, step-offs, or deformities.   Skin:     General: Skin is warm and dry.      Capillary Refill: Capillary refill takes less than 2 seconds.      Coloration: Skin is not jaundiced.      Findings: No rash.   Neurological:      Mental Status: She is oriented to person, place, and time.      Sensory: Sensory deficit present.      Motor: Weakness present.      Comments: Patient is alert and oriented x 4.  She is sleepy, but awakes to voice easily.  She does follow simple commands and answers questions appropriately.  Patient is appreciated to have right-sided weakness as well as decreased sensation.  She tells me that this has been present since Friday when she had a seizure at that time.  Please see NIH stroke scale   Psychiatric:         Mood and Affect: Mood normal.         Behavior: Behavior normal.        Past Medical History:   Diagnosis Date    Pacemaker     Seizure (Multi)       History reviewed. No pertinent surgical history.   Social History     Socioeconomic History    Marital status: Single     Spouse name: None    Number of children: None    Years of education: None    Highest education level: None   Occupational History    None   Tobacco Use    Smoking status: Never    Smokeless tobacco: Never   Substance and Sexual Activity    Alcohol use: None    Drug use: Yes     Types: Marijuana     Sexual activity: None   Other Topics Concern    None   Social History Narrative    None     Social Determinants of Health     Financial Resource Strain: Low Risk  (7/7/2024)    Overall Financial Resource Strain (CARDIA)     Difficulty of Paying Living Expenses: Not very hard   Food Insecurity: Not on file   Transportation Needs: Unmet Transportation Needs (7/7/2024)    PRAPARE - Transportation     Lack of Transportation (Medical): Yes     Lack of Transportation (Non-Medical): Yes   Physical Activity: Not on file   Stress: Not on file   Social Connections: Not on file   Intimate Partner Violence: Not on file   Housing Stability: High Risk (7/7/2024)    Housing Stability Vital Sign     Unable to Pay for Housing in the Last Year: Patient unable to answer     Number of Places Lived in the Last Year: 3     Unstable Housing in the Last Year: Yes      ------------------------------------------------------------------------------------------------------------------------------------------  CT head wo IV contrast   Final Result   Normal CT of the brain.        Signed by: Ciro Lopez 7/7/2024 12:56 PM   Dictation workstation:   [id number]      CT cervical spine wo IV contrast   Final Result   Normal CT of the cervical spine.        Signed by: Ciro Lopez 7/7/2024 12:58 PM   Dictation workstation:   [id number]      Cardiac device check - Inpatient    (Results Pending)      Labs Reviewed   CBC WITH AUTO DIFFERENTIAL - Abnormal       Result Value    WBC 6.5      nRBC 0.0      RBC 3.98 (*)     Hemoglobin 12.3      Hematocrit 35.2 (*)     MCV 88      MCH 30.9      MCHC 34.9      RDW 13.2      Platelets 160      Neutrophils % 53.0      Immature Granulocytes %, Automated 0.3      Lymphocytes % 36.7      Monocytes % 8.0      Eosinophils % 1.5      Basophils % 0.5      Neutrophils Absolute 3.46      Immature Granulocytes Absolute, Automated 0.02      Lymphocytes Absolute 2.39      Monocytes Absolute 0.52      Eosinophils  Absolute 0.10      Basophils Absolute 0.03     COMPREHENSIVE METABOLIC PANEL - Abnormal    Glucose 101 (*)     Sodium 140      Potassium 3.6      Chloride 111 (*)     Bicarbonate 19 (*)     Anion Gap 14      Urea Nitrogen 20      Creatinine 0.79      eGFR >90      Calcium 9.4      Albumin 4.4      Alkaline Phosphatase 42      Total Protein 7.3      AST 8 (*)     Bilirubin, Total 0.5      ALT 10     PROTIME-INR - Abnormal    Protime 13.2 (*)     INR 1.2 (*)    MAGNESIUM - Normal    Magnesium 1.98     LACTATE - Normal    Lactate 1.0      Narrative:     Venipuncture immediately after or during the administration of Metamizole may lead to falsely low results. Testing should be performed immediately  prior to Metamizole dosing.   HUMAN CHORIONIC GONADOTROPIN, SERUM QUANTITATIVE - Normal    HCG, Beta-Quantitative <2      Narrative:      Total HCG measurement is performed using the [employer]   Immunoassay which detects intact HCG and free beta HCG subunit.    This test is not indicated for use as a tumor marker.   HCG testing is performed using a different test methodology at [facility] than other [facility]. Direct result comparison   should only be made within the same method.       ALCOHOL - Normal    Alcohol <10     SERIAL TROPONIN-INITIAL - Normal    Troponin I, High Sensitivity <3      Narrative:     Less than 99th percentile of normal range cutoff-  Female and children under 18 years old <14 ng/L; Male <21 ng/L: Negative  Repeat testing should be performed if clinically indicated.     Female and children under 18 years old 14-50 ng/L; Male 21-50 ng/L:  Consistent with possible cardiac damage and possible increased clinical   risk. Serial measurements may help to assess extent of myocardial damage.     >50 ng/L: Consistent with cardiac damage, increased clinical risk and  myocardial infarction. Serial measurements may help assess extent of   myocardial damage.      NOTE: Children  less than 1 year old may have higher baseline troponin   levels and results should be interpreted in conjunction with the overall   clinical context.     NOTE: Troponin I testing is performed using a different   testing methodology at [facility] than at other   [facility]. Direct result comparisons should only   be made within the same method.   SERIAL TROPONIN, 1 HOUR - Normal    Troponin I, High Sensitivity <3      Narrative:     Less than 99th percentile of normal range cutoff-  Female and children under 18 years old <14 ng/L; Male <21 ng/L: Negative  Repeat testing should be performed if clinically indicated.     Female and children under 18 years old 14-50 ng/L; Male 21-50 ng/L:  Consistent with possible cardiac damage and possible increased clinical   risk. Serial measurements may help to assess extent of myocardial damage.     >50 ng/L: Consistent with cardiac damage, increased clinical risk and  myocardial infarction. Serial measurements may help assess extent of   myocardial damage.      NOTE: Children less than 1 year old may have higher baseline troponin   levels and results should be interpreted in conjunction with the overall   clinical context.     NOTE: Troponin I testing is performed using a different   testing methodology at [facility] than at other   [facility]. Direct result comparisons should only   be made within the same method.   TROPONIN SERIES- (INITIAL, 1 HR)    Narrative:     The following orders were created for panel order Troponin I Series, High Sensitivity (0, 1 HR).  Procedure                               Abnormality         Status                     ---------                               -----------         ------                     Troponin I, High Sensiti...[[id number]]  Normal              Final result               Troponin, High Sensitivi...[[id number]]  Normal              Final result                 Please view results for these tests  on the individual orders.   DRUG SCREEN, URINE WITH REFLEX TO CONFIRMATION   URINALYSIS WITH REFLEX CULTURE AND MICROSCOPIC    Narrative:     The following orders were created for panel order Urinalysis with Reflex Culture and Microscopic.  Procedure                               Abnormality         Status                     ---------                               -----------         ------                     Urinalysis with Reflex C...[[id number]]                                                 Extra Urine Gray Tube[[id number]]                                                         Please view results for these tests on the individual orders.   URINALYSIS WITH REFLEX CULTURE AND MICROSCOPIC   EXTRA URINE GRAY TUBE   LACOSAMIDE   MAGNESIUM        Medical Decision Making    Last Known Well Time: 25 reports she noticed the symptoms after a seizure on 2024  Interval: Baseline  Time: 12:41 PM  Person Administering Scale: Parvez Norton DO     1a  Level of consciousness: 0=alert; keenly responsive  1b. LOC questions:  0=Performs both tasks correctly  1c. LOC commands: 0=Performs both tasks correctly  2.  Best Gaze: 0=normal  3. Visual: 0=No visual loss  4. Facial Palsy: 0=Normal symmetric movement  5a. Motor left arm: 0=No drift, limb holds 90 (or 45) degrees for full 10 seconds  5b.  Motor right arm: 1=Drift, limb holds 90 (or 45) degrees but drifts down before full 10 seconds: does not hit bed  6a. motor left le=No drift, limb holds 90 (or 45) degrees for full 10 seconds  6b  Motor right le=Some effort against gravity, limb cannot get to or maintain (if cured) 90 (or 45) degrees, drifts down to bed, but has some effort against gravity  7. Limb Ataxia: 0=Absent  8.  Sensory: 1=Mild to moderate sensory loss; patient feels pinprick is less sharp or is dull on the affected side; there is a loss of superficial pain with pinprick but patient is aware She is being touched  9. Best Language:  0=No aphasia,  normal  10. Dysarthria: 0=Normal  11. Extinction and Inattention: 0=No abnormality    Total:   3        VAN: VAN: Negative       24-year-old female presents emergency department via EMS with report of seizure-like activity.  Patient was given Versed and route by EMS.  She remained in seizure precautions throughout her stay in the ED and did not have any further seizure-like activity.  She does admit to missing her seizure medication over the past week and I suspect this contributed to her seizure today.  On my exam the patient is afebrile nontoxic.  She is appreciated to be sleepy consistent with postictal state, but alert and interactive.  Patient is noted to have right-sided deficits with numbness and tingling of the right side, drift in the right arm, and only effort against gravity in the right leg.  I did reevaluate this deficit during her stay and she did have some improvement in her strength of arm and leg, but continues to have tingling sensation.  I discussed this with the patient and she tells me that his symptoms have been present since she had a seizure on Friday.  I do suspect that this could be secondary to seizure possible Allan's paralysis.  She is out of the window for any treatment of possible stroke such as TNK or clot retrieval.  A head CT was obtained and showed no acute intracranial process such as hemorrhage or mass effect.  Cervical spine CT also does not show traumatic injury.  An order for cardiac device check was placed, but this was not obtained prior to disposition.  Cardiac enzymes x 2 and EKG showed no acute ACS or significant arrhythmia.  CMP shows slightly low bicarb which may be consistent with the patient's having a seizure.  No other significant metabolic abnormalities.  CBC shows no significant leukocytosis or anemia.  Patient does not have findings of sepsis.  Blood alcohol is not significantly elevated patient denies regular alcohol use and I do not suspect withdrawal seizure.   Pregnancy testing is negative.  Lactate is not significantly elevated.  UA and urine drug screen is well as Vimpat levels are pending.  Patient is given her home dose of Vimpat while here in the emergency department and treated with IV fluids.  Given the patient's continued deficits on the right side I recommend further workup and evaluation of this as well as further treatment of her breakthrough seizures.  Patient agreeable with this plan.  Case is discussed with hospitalist on-call.             Diagnoses as of 07/07/24 2215   Breakthrough seizure (Multi)   Right sided weakness      1. Breakthrough seizure (Multi)        2. Right sided weakness           Procedures     This note was dictated using dragon software and may contain errors related to dictation interpretation errors.      Parvez Norton, DO  07/07/24 [id number]

## 2024-07-08 ENCOUNTER — APPOINTMENT (OUTPATIENT)
Dept: NEUROLOGY | Facility: HOSPITAL | Age: 24
End: 2024-07-08
Payer: COMMERCIAL

## 2024-07-08 ENCOUNTER — APPOINTMENT (OUTPATIENT)
Dept: CARDIOLOGY | Facility: HOSPITAL | Age: 24
End: 2024-07-08
Payer: COMMERCIAL

## 2024-07-08 VITALS
DIASTOLIC BLOOD PRESSURE: 53 MMHG | SYSTOLIC BLOOD PRESSURE: 103 MMHG | HEART RATE: 70 BPM | BODY MASS INDEX: 25.12 KG/M2 | TEMPERATURE: 97.2 F | WEIGHT: 160.05 LBS | RESPIRATION RATE: 16 BRPM | HEIGHT: 67 IN | OXYGEN SATURATION: 98 %

## 2024-07-08 LAB
AMPHETAMINES UR QL SCN: ABNORMAL
APPEARANCE UR: CLEAR
BACTERIA #/AREA URNS AUTO: ABNORMAL /HPF
BARBITURATES UR QL SCN: ABNORMAL
BENZODIAZ UR QL SCN: ABNORMAL
BILIRUB UR STRIP.AUTO-MCNC: NEGATIVE MG/DL
BZE UR QL SCN: ABNORMAL
CANNABINOIDS UR QL SCN: ABNORMAL
COLOR UR: YELLOW
FENTANYL+NORFENTANYL UR QL SCN: ABNORMAL
GLUCOSE UR STRIP.AUTO-MCNC: NORMAL MG/DL
HOLD SPECIMEN: NORMAL
HYALINE CASTS #/AREA URNS AUTO: ABNORMAL /LPF
KETONES UR STRIP.AUTO-MCNC: NEGATIVE MG/DL
LEUKOCYTE ESTERASE UR QL STRIP.AUTO: ABNORMAL
MAGNESIUM SERPL-MCNC: 1.93 MG/DL (ref 1.6–2.4)
METHADONE UR QL SCN: ABNORMAL
MUCOUS THREADS #/AREA URNS AUTO: ABNORMAL /LPF
NITRITE UR QL STRIP.AUTO: NEGATIVE
OPIATES UR QL SCN: ABNORMAL
OXYCODONE+OXYMORPHONE UR QL SCN: ABNORMAL
PCP UR QL SCN: ABNORMAL
PH UR STRIP.AUTO: 6 [PH]
PROT UR STRIP.AUTO-MCNC: ABNORMAL MG/DL
RBC # UR STRIP.AUTO: ABNORMAL /UL
RBC #/AREA URNS AUTO: ABNORMAL /HPF
SP GR UR STRIP.AUTO: 1.03
SQUAMOUS #/AREA URNS AUTO: ABNORMAL /HPF
UROBILINOGEN UR STRIP.AUTO-MCNC: NORMAL MG/DL
WBC #/AREA URNS AUTO: ABNORMAL /HPF

## 2024-07-08 PROCEDURE — 80307 DRUG TEST PRSMV CHEM ANLYZR: CPT | Performed by: EMERGENCY MEDICINE

## 2024-07-08 PROCEDURE — 99223 1ST HOSP IP/OBS HIGH 75: CPT | Performed by: PSYCHIATRY & NEUROLOGY

## 2024-07-08 PROCEDURE — 80349 CANNABINOIDS NATURAL: CPT | Performed by: EMERGENCY MEDICINE

## 2024-07-08 PROCEDURE — 2500000001 HC RX 250 WO HCPCS SELF ADMINISTERED DRUGS (ALT 637 FOR MEDICARE OP): Performed by: STUDENT IN AN ORGANIZED HEALTH CARE EDUCATION/TRAINING PROGRAM

## 2024-07-08 PROCEDURE — 81001 URINALYSIS AUTO W/SCOPE: CPT | Performed by: EMERGENCY MEDICINE

## 2024-07-08 PROCEDURE — 87086 URINE CULTURE/COLONY COUNT: CPT | Mod: ELYLAB | Performed by: EMERGENCY MEDICINE

## 2024-07-08 PROCEDURE — 95819 EEG AWAKE AND ASLEEP: CPT

## 2024-07-08 PROCEDURE — 83735 ASSAY OF MAGNESIUM: CPT | Performed by: STUDENT IN AN ORGANIZED HEALTH CARE EDUCATION/TRAINING PROGRAM

## 2024-07-08 PROCEDURE — G0378 HOSPITAL OBSERVATION PER HR: HCPCS

## 2024-07-08 PROCEDURE — 99232 SBSQ HOSP IP/OBS MODERATE 35: CPT | Performed by: FAMILY MEDICINE

## 2024-07-08 PROCEDURE — 36415 COLL VENOUS BLD VENIPUNCTURE: CPT | Performed by: STUDENT IN AN ORGANIZED HEALTH CARE EDUCATION/TRAINING PROGRAM

## 2024-07-08 PROCEDURE — 80324 DRUG SCREEN AMPHETAMINES 1/2: CPT | Performed by: EMERGENCY MEDICINE

## 2024-07-08 PROCEDURE — 80346 BENZODIAZEPINES1-12: CPT | Mod: ELYLAB | Performed by: EMERGENCY MEDICINE

## 2024-07-08 PROCEDURE — 93279 PRGRMG DEV EVAL PM/LDLS PM: CPT

## 2024-07-08 PROCEDURE — 97161 PT EVAL LOW COMPLEX 20 MIN: CPT | Mod: GP | Performed by: PHYSICAL THERAPIST

## 2024-07-08 RX ORDER — LACOSAMIDE 100 MG/1
100 TABLET ORAL 2 TIMES DAILY
Qty: 120 TABLET | Refills: 0 | Status: SHIPPED | OUTPATIENT
Start: 2024-07-08 | End: 2024-07-08

## 2024-07-08 RX ORDER — LACOSAMIDE 100 MG/1
100 TABLET ORAL 2 TIMES DAILY
Qty: 60 TABLET | Refills: 0 | Status: SHIPPED | OUTPATIENT
Start: 2024-07-08

## 2024-07-08 RX ADMIN — LACOSAMIDE 100 MG: 100 TABLET, FILM COATED ORAL at 08:17

## 2024-07-08 ASSESSMENT — ENCOUNTER SYMPTOMS
WHEEZING: 0
WEAKNESS: 0
SHORTNESS OF BREATH: 0
NECK PAIN: 0
JOINT SWELLING: 0
SLEEP DISTURBANCE: 0
PALPITATIONS: 0
FEVER: 0
SPEECH DIFFICULTY: 0
DIFFICULTY URINATING: 0
CONFUSION: 1
ADENOPATHY: 0
HEADACHES: 0
HALLUCINATIONS: 0
NECK STIFFNESS: 0
DECREASED CONCENTRATION: 1
SEIZURES: 1
ABDOMINAL PAIN: 0
FACIAL ASYMMETRY: 0
PHOTOPHOBIA: 0
SINUS PRESSURE: 0
BRUISES/BLEEDS EASILY: 0
VOMITING: 0
ARTHRALGIAS: 0
NUMBNESS: 0
TREMORS: 0
COUGH: 0
NAUSEA: 0
AGITATION: 0
FATIGUE: 0
DIZZINESS: 0
TROUBLE SWALLOWING: 0
BACK PAIN: 0
HYPERACTIVE: 0
LIGHT-HEADEDNESS: 0
EYE PAIN: 0
FREQUENCY: 0
UNEXPECTED WEIGHT CHANGE: 0

## 2024-07-08 ASSESSMENT — COGNITIVE AND FUNCTIONAL STATUS - GENERAL
CLIMB 3 TO 5 STEPS WITH RAILING: A LITTLE
DAILY ACTIVITIY SCORE: 24
WALKING IN HOSPITAL ROOM: A LITTLE
MOBILITY SCORE: 22
MOBILITY SCORE: 24

## 2024-07-08 ASSESSMENT — PAIN SCALES - GENERAL
PAINLEVEL_OUTOF10: 0 - NO PAIN
PAINLEVEL_OUTOF10: 0 - NO PAIN

## 2024-07-08 NOTE — PROGRESS NOTES
Prasad Breen is a 23 y.o. female on day 0 of admission presenting with Seizure (Multi).      Subjective   No further seizures    Objective     Last Recorded Vitals  /53 (BP Location: Right arm, Patient Position: Lying)   Pulse 70   Temp 36.2 °C (97.2 °F) (Temporal)   Resp 16   Wt 72.6 kg (160 lb 0.9 oz)   SpO2 98%   Intake/Output last 3 Shifts:    Intake/Output Summary (Last 24 hours) at 7/8/2024 1257  Last data filed at 7/7/2024 2138  Gross per 24 hour   Intake 1354.17 ml   Output --   Net 1354.17 ml       Admission Weight  Weight: 65.8 kg (145 lb) (07/07/24 1211)    Daily Weight  07/07/24 : 72.6 kg (160 lb 0.9 oz)    Image Results  CT cervical spine wo IV contrast  Narrative: Interpreted By:  Ciro Lopez,   STUDY:  CT CERVICAL SPINE WO IV CONTRAST; 7/7/2024 12:50 pm      INDICATION:  Signs/Symptoms:seizure      COMPARISON:  None.      ACCESSION NUMBER(S):  [id number]      ORDERING CLINICIAN:  HERNANDO ANDRADE      TECHNIQUE:  Axial unenhanced images were obtained through the cervical spine.  Sagittal and coronal post processing reconstruction images were  obtained.      All CT examinations are performed with one or more of the following  dose reduction techniques: Automated Exposure Control, adjustment of  mA and/or kV according to patient size, or use of iterative  reconstruction techniques.      FINDINGS:  No fracture is seen.  The vertebral body heights are maintained.  The vertebral alignment is anatomic; this is confirmed on the  sagittal reconstruction images. The odontoid process and the  prevertebral soft tissues are within normal limits. The uncovertebral  joints and facet joints are preserved. There is no spinal canal  stenosis or foraminal stenosis. Images from the thoracic inlet are  unremarkable.      Impression: Normal CT of the cervical spine.      Signed by: Ciro Lopez 7/7/2024 12:58 PM  Dictation workstation:   [id number]  CT head wo IV contrast  Narrative: Interpreted By:   Ciro Lopez,   STUDY:  CT HEAD WO IV CONTRAST; 7/7/2024 12:50 pm      INDICATION:  Signs/Symptoms:Seizure R sided weakness      COMPARISON:  None      ACCESSION NUMBER(S):  [id number]      ORDERING CLINICIAN:  HERNANDO ANDRADE      TECHNIQUE:  Axial images were obtained through the brain. No IV contrast was  administered.      All CT examinations are performed with one or more of the following  dose reduction techniques: Automated Exposure Control, adjustment of  mA and/or kV according to patient size, or use of iterative  reconstruction techniques.      FINDINGS:  The ventricles are normal in size and midline in position.  There is no  intracranial hemorrhage.  No mass or mass effect is seen.  The osseous structures are unremarkable.      Impression: Normal CT of the brain.      Signed by: Ciro Lopez 7/7/2024 12:56 PM  Dictation workstation:   [id number]      Physical Exam  Constitutional:       Appearance: Normal appearance.   HENT:      Head: Normocephalic and atraumatic.      Nose: Nose normal.   Eyes:      Extraocular Movements: Extraocular movements intact.      Pupils: Pupils are equal, round, and reactive to light.   Cardiovascular:      Rate and Rhythm: Normal rate and regular rhythm.      Heart sounds: Normal heart sounds.   Pulmonary:      Effort: Pulmonary effort is normal.      Breath sounds: Normal breath sounds.   Abdominal:      General: Bowel sounds are normal.      Palpations: Abdomen is soft.   Musculoskeletal:      Cervical back: Neck supple.   Skin:     General: Skin is warm and dry.   Neurological:      General: No focal deficit present.      Mental Status: She is alert. Mental status is at baseline.   Psychiatric:         Mood and Affect: Mood normal.     Assessment/Plan   Seizure  Medical noncompliance  Right-sided weakness  Metabolic acidosis    Plan:  Continue Vimpat 100 mg twice a day.  Consult neurology.   DVT prophylaxis  Will order meds to antione Ely MD

## 2024-07-08 NOTE — PROGRESS NOTES
Physical Therapy    Physical Therapy Evaluation    Patient Name: Prasad Breen  MRN: [id number]  Today's Date: 7/8/2024   Time Calculation  Start Time: 0841  Stop Time: 0850  Time Calculation (min): 9 min    Assessment/Plan   PT Assessment  Evaluation/Treatment Tolerance: Patient tolerated treatment well  End of Session Patient Position: Bed, 3 rail up, Alarm off, not on at start of session  IP OR SWING BED PT PLAN  Inpatient or Swing Bed: Inpatient  PT Plan  PT Plan: PT Eval only  PT Eval Only Reason: No acute PT needs identified  PT Discharge Recommendations: No PT needed after discharge  PT Recommended Transfer Status: Stand by assist  PT - OK to Discharge:(once deemed medically appropriate)      Subjective   General Visit Information:  General  Reason for Referral: Impaired mobilty  Referred By: PT/OT 7/7/24 Silvia  Past Medical History Relevant to Rehab: Seizure (snce age of 15), PPM, marijuana, Sober x 1 year  Patient Position Received: Bed, 3 rail up, Alarm off, not on at start of session (seizure pads)  General Comment: To ED 7//7/24 for seizure like actiivty. Observed seizures by EMS. Patient reports runnng out of medications. + toxicity screen for ampheteimines, cannabnods, benzos; CT 7/724 Head (-); Cervical spne (-)  Home Living:  Home Living  Home Living Comments: Per patient report resdes with boyfrined and 1 yo. Currently staying at friends? 1 story no steps to entter. 1yo is at patinet's mothers. Walk n shower wt seat and grab bar.  Prior Level of Function:  Prior Function Per Pt/Caregiver Report  Prior Function Comments: Per patient report independetn in moblty, aDLs and IADLs without assistive device. Sateas she is able to provde care to her 1 yo. Denies any falls. Does not drive but rides electric bicycle.  Precautions:  Precautions  Medical Precautions: Seizure precautions      Objective   Pain:  Pain Assessment  0-10 (Numeric) Pain Score: 0 - No pain  Cognition:  Cognition  Overall Cognitive  Status: Within Functional Limits (lethargic)    General Assessments:  General Observation  General Observation: Patien sleepng upon arrival to room Easily roused     Activity Tolerance  Endurance:  (Far)    Static Sitting Balance  Static Sitting-: good  Dynamic Sitting Balance  Dynamic Sitting- good    Static Standing Balance  Static Standing-s: good  Dynamic Standing Balance  Dynamic Standing- fair +  Functional Assessments:  Bed Mobility  Bed Mobility:  (Supnie <> sit independent)    Transfers  Transfer:  (sit <> stand independent)    Ambulation/Gait Training  Ambulation/Gait Training Performed:  (Patient ambulated 50' without assisitve device supervised.)  Extremity/Trunk Assessments:  RUE   RUE : Within Functional Limits (MMT 5/5 grossly)  LUE   LUE: Within Functional Limits (MMT 5/5 grossly)  RLE   RLE : Within Functional Limits (MMT 5/5 grossly)  LLE   LLE :  (MMT 5/5 grossly)  Outcome Measures:  [facility] Basic Mobility  Turning from your back to your side while in a flat bed without using bedrails: None  Moving from lying on your back to sitting on the side of a flat bed without using bedrails: None  Moving to and from bed to chair (including a wheelchair): None  Standing up from a chair using your arms (e.g. wheelchair or bedside chair): None  To walk in hospital room: A little  Climbing 3-5 steps with railing: A little  Basic Mobility - Total Score: 22

## 2024-07-08 NOTE — DISCHARGE SUMMARY
Discharge Diagnosis  Seizure (Multi)      Discharge Meds     Your medication list        START taking these medications        Instructions Last Dose Given Next Dose Due   nasal spray midazolam 5 mg/spray (0.1 mL) spray,non-aerosol  Commonly known as: Versed      Administer 1.452 sprays into affected nostril(s) 1 time if needed (seizure) for up to 2 doses.              CONTINUE taking these medications        Instructions Last Dose Given Next Dose Due   lacosamide 100 mg tablet  Commonly known as: Vimpat      Take 1 tablet (100 mg) by mouth 2 times a day.       lidocaine 5 % patch  Commonly known as: Lidoderm           QUEtiapine 100 mg tablet  Commonly known as: SEROquel                     Where to Get Your Medications        These medications were sent to [facility]  [address] [address]      Hours: 9 AM to 5:30 PM Mon-Fri, 9 AM to 1 PM Saturday Phone: [phone]   lacosamide 100 mg tablet  nasal spray midazolam 5 mg/spray (0.1 mL) spray,non-aerosol         Test Results Pending At Discharge  Pending Labs       Order Current Status    Amphetamine Confirm, Urine In process    Benzodiazepine Confirmation, Urine In process    Lacosamide In process    OOB Internal Tracking In process    THC (Marijuana), Urine, Confirmation In process    Urine Culture In process            Hospital Course Prasad Breen is a 23 y.o. female presenting with multiple episodes of seizure.  Patient ran out of her seizure medication Vimpat.  In ED, CT head is negative for acute findings, CT cervical spine is also negative for acute findings.  Patient is admitted for further evaluation and observation.  Patient also reported right-sided weakness. the patient reports that she did not  her medications from the pharmacy a few days ago.  She had no further seizures.  She was evaluated by neurology who recommended lacosamide and as needed midazolam spray for breakthrough seizures.  She is discharged  in a stable condition with close follow-up with her neurologist in [place]       Pertinent Physical Exam At Time of Discharge  Physical Exam   Constitutional:       Appearance: Normal appearance.   HENT:      Head: Normocephalic and atraumatic.      Nose: Nose normal.   Eyes:      Extraocular Movements: Extraocular movements intact.      Pupils: Pupils are equal, round, and reactive to light.   Cardiovascular:      Rate and Rhythm: Normal rate and regular rhythm.      Heart sounds: Normal heart sounds.   Pulmonary:      Effort: Pulmonary effort is normal.      Breath sounds: Normal breath sounds.   Abdominal:      General: Bowel sounds are normal.      Palpations: Abdomen is soft.   Musculoskeletal:      Cervical back: Neck supple.   Skin:     General: Skin is warm and dry.   Neurological:      General: No focal deficit present.      Mental Status: She is alert. Mental status is at baseline.   Psychiatric:         Mood and Affect: Mood normal.       No Ely MD

## 2024-07-08 NOTE — PROGRESS NOTES
Patient desires [facility] at Home program. Plan is to return to home. She advises that she is safe there with present extended family an friends. Spoke with attending physician regarding need for assistance with script for antiseizure medications.

## 2024-07-08 NOTE — CONSULTS
History Of Present Illness  Prasad Breen is a 23 y.o. female presenting with breakthrough seizures.  Patient was with her boyfriend when she had a breakthrough seizure and she had missed a few doses of her Vimpat.    At the time of my evaluation she is back to baseline and denies any seizures after getting Vimpat.  She has a longstanding history of seizure disorder since age 15 and was on Dilantin and Keppra which gave her side effects.  She has been doing very well on Vimpat and has been seen and [place] [facility]    Her last seizure was about a month ago and at that time she had a cardiac arrest and required defibrillator.  She had an MRI and complete workup done prior to that which did not show any abnormalities.    Her birth and developmental history's were unremarkable and she has 8 siblings and nobody in the family is any history of seizure disorder.  She lives with her boyfriend and  his mom.    She has a history of alcohol and drug abuse and has been sober for more than 2 years    Please refer to the initial H&P and the ER records for details.    Past Medical History  Past Medical History:   Diagnosis Date    Pacemaker     Seizure (Multi)        Surgical History  History reviewed. No pertinent surgical history.    Social History  Social History     Tobacco Use    Smoking status: Never    Smokeless tobacco: Never   Substance Use Topics    Drug use: Yes     Types: Marijuana       Allergies  Amoxicillin, Iodinated contrast media, and Penicillins    Medications Prior to Admission   Medication Sig Dispense Refill Last Dose    lacosamide (Vimpat) 100 mg tablet Take 1 tablet (100 mg) by mouth 2 times a day.   Past Week    QUEtiapine (SEROquel) 100 mg tablet Take 1 tablet (100 mg) by mouth once daily at bedtime.   Past Week    lidocaine (Lidoderm) 5 % patch Place 1 patch on the skin once daily. Remove & discard patch within 12 hours or as directed by MD.          Review of Systems    Constitutional:  Negative for fatigue, fever and unexpected weight change.   HENT:  Negative for dental problem, ear pain, hearing loss, sinus pressure, tinnitus and trouble swallowing.    Eyes:  Negative for photophobia, pain and visual disturbance.   Respiratory:  Negative for cough, shortness of breath and wheezing.    Cardiovascular:  Negative for chest pain, palpitations and leg swelling.   Gastrointestinal:  Negative for abdominal pain, nausea and vomiting.   Genitourinary:  Negative for difficulty urinating, enuresis and frequency.   Musculoskeletal:  Negative for arthralgias, back pain, joint swelling, neck pain and neck stiffness.   Skin:  Negative for pallor and rash.   Allergic/Immunologic: Negative for food allergies.   Neurological:  Positive for seizures. Negative for dizziness, tremors, syncope, facial asymmetry, speech difficulty, weakness, light-headedness, numbness and headaches.   Hematological:  Negative for adenopathy. Does not bruise/bleed easily.   Psychiatric/Behavioral:  Positive for confusion and decreased concentration. Negative for agitation, behavioral problems, hallucinations and sleep disturbance. The patient is not hyperactive.        Physical Exam  Neurological Exam  Constitutional: General appearance: no acute distress   Auscultation of Heart: Regular rate and rhythm, no murmurs, normal S1 and S2.   Carotid Arteries: Intact without any bruits.   Neck is supple.   No lymph adenopathy.   Peripheral Vascular Exam: Pulses +2 and equal in all extremities. No swelling, varicosities, edema or tenderness to palpations.    Abdomen is soft, nondistended. No organomegaly.  Mental status: The patient was in no distress, alert, interactive and cooperative. Affect is appropriate.   Orientation: oriented to person, oriented to place and oriented to time.   Memory: recent memory intact and remote memory intact.   Attention: normal attention span and normal concentrating ability.   Language:  "normal comprehension and no difficulty naming common objects.   Fund of knowledge: Patient displays adequate knowledge of current events, adequate fund of knowledge regarding past history and adequate fund of knowledge regarding vocabulary.   Eyes: The ophthalmoscopic examination was normal. The fundi are visualized with normal disc margins and without.  Cranial nerve II: Visual fields full to confrontation.   Cranial nerves III, IV, and VI: Pupils round, equally reactive to light; no ptosis. EOMs intact. No nystagmus.   Cranial Nerve V: Facial sensation intact bilaterally.   Cranial nerve VII: Normal and symmetric facial strength.   Cranial nerve VIII: Hearing is intact bilaterally to finger rub / whisper.   Cranial nerves IX and X: Palate elevates symmetrically.   Cranial nerve XI: Shoulder shrug and neck rotation strength are intact.   Cranial nerve XII: Tongue midline with normal strength.   Motor: Motor exam was normal. Muscle bulk was normal in both upper and lower extremities. Muscle tone was normal in both upper and lower extremities. Muscle strength was 5/5 throughout. no abnormal or adventitious movements were present.   Deep Tendon Reflexes: left biceps 2+ , right biceps 2+, left triceps 2+, right triceps 2+, left brachioradialis 2+, right brachioradialis 2+, left patella 2+, right patella 2+, left ankle jerk 2+, right ankle jerk 2+   Plantar Reflex: Toes downgoing to plantar stimulation on the left. Toes downgoing to plantar stimulation on the right.   Sensory Exam: Normal to light touch.   Coordination: There is no limb dystaxia and rapid alternating movements are intact.  Gait: Gait is normal without spasticity, ataxia or bradykinesia. Stance is stable with a negative Romberg.  Last Recorded Vitals  Blood pressure 103/53, pulse 70, temperature 36.2 °C (97.2 °F), temperature source Temporal, resp. rate 16, height 1.702 m (5' 7.01\"), weight 72.6 kg (160 lb 0.9 oz), SpO2 98%.    Relevant Results  Recent " Results (from the past 24 hour(s))   Troponin, High Sensitivity, 1 Hour    Collection Time: 07/07/24  2:53 PM   Result Value Ref Range    Troponin I, High Sensitivity <3 0 - 13 ng/L   Drug Screen, Urine With Reflex to Confirmation    Collection Time: 07/08/24  1:59 AM   Result Value Ref Range    Amphetamine Screen, Urine Presumptive Positive (A) Presumptive Negative    Barbiturate Screen, Urine Presumptive Negative Presumptive Negative    Benzodiazepines Screen, Urine Presumptive Positive (A) Presumptive Negative    Cannabinoid Screen, Urine Presumptive Positive (A) Presumptive Negative    Cocaine Metabolite Screen, Urine Presumptive Negative Presumptive Negative    Fentanyl Screen, Urine Presumptive Negative Presumptive Negative    Opiate Screen, Urine Presumptive Negative Presumptive Negative    Oxycodone Screen, Urine Presumptive Negative Presumptive Negative    PCP Screen, Urine Presumptive Negative Presumptive Negative    Methadone Screen, Urine Presumptive Negative Presumptive Negative   Urinalysis with Reflex Culture and Microscopic    Collection Time: 07/08/24  1:59 AM   Result Value Ref Range    Color, Urine Yellow Light-Yellow, Yellow, Dark-Yellow    Appearance, Urine Clear Clear    Specific Gravity, Urine 1.030 1.005 - 1.035    pH, Urine 6.0 5.0, 5.5, 6.0, 6.5, 7.0, 7.5, 8.0    Protein, Urine 10 (TRACE) NEGATIVE, 10 (TRACE), 20 (TRACE) mg/dL    Glucose, Urine Normal Normal mg/dL    Blood, Urine 0.03 (TRACE) (A) NEGATIVE    Ketones, Urine NEGATIVE NEGATIVE mg/dL    Bilirubin, Urine NEGATIVE NEGATIVE    Urobilinogen, Urine Normal Normal mg/dL    Nitrite, Urine NEGATIVE NEGATIVE    Leukocyte Esterase, Urine 25 Bertha/µL (A) NEGATIVE   Extra Urine Gray Tube    Collection Time: 07/08/24  1:59 AM   Result Value Ref Range    Extra Tube Hold for add-ons.    Microscopic Only, Urine    Collection Time: 07/08/24  1:59 AM   Result Value Ref Range    WBC, Urine 1-5 1-5, NONE /HPF    RBC, Urine 3-5 NONE, 1-2, 3-5 /HPF     Squamous Epithelial Cells, Urine 1-9 (SPARSE) Reference range not established. /HPF    Bacteria, Urine 1+ (A) NONE SEEN /HPF    Mucus, Urine 2+ Reference range not established. /LPF    Hyaline Casts, Urine 1+ (A) NONE /LPF   Magnesium    Collection Time: 07/08/24  5:34 AM   Result Value Ref Range    Magnesium 1.93 1.60 - 2.40 mg/dL   Lavender Top    Collection Time: 07/08/24  5:34 AM   Result Value Ref Range    Extra Tube Hold for add-ons.    SST TOP    Collection Time: 07/08/24  5:34 AM   Result Value Ref Range    Extra Tube Hold for add-ons.                        Smith Coma Scale  Best Eye Response: Spontaneous  Best Verbal Response: Oriented  Best Motor Response: Follows commands  Tawana Coma Scale Score: 15                 CT cervical spine wo IV contrast    Result Date: 7/7/2024  Interpreted By:  Ciro Lopez, STUDY: CT CERVICAL SPINE WO IV CONTRAST; 7/7/2024 12:50 pm   INDICATION: Signs/Symptoms:seizure   COMPARISON: None.   ACCESSION NUMBER(S): [id number]   ORDERING CLINICIAN: HERNANDO ANDRADE   TECHNIQUE: Axial unenhanced images were obtained through the cervical spine. Sagittal and coronal post processing reconstruction images were obtained.   All CT examinations are performed with one or more of the following dose reduction techniques: Automated Exposure Control, adjustment of mA and/or kV according to patient size, or use of iterative reconstruction techniques.   FINDINGS: No fracture is seen. The vertebral body heights are maintained. The vertebral alignment is anatomic; this is confirmed on the sagittal reconstruction images. The odontoid process and the prevertebral soft tissues are within normal limits. The uncovertebral joints and facet joints are preserved. There is no spinal canal stenosis or foraminal stenosis. Images from the thoracic inlet are unremarkable.       Normal CT of the cervical spine.   Signed by: Ciro Lopez 7/7/2024 12:58 PM Dictation workstation:   [id number]    CT head  wo IV contrast    Result Date: 7/7/2024  Interpreted By:  Ciro Lopez, STUDY: CT HEAD WO IV CONTRAST; 7/7/2024 12:50 pm   INDICATION: Signs/Symptoms:Seizure R sided weakness   COMPARISON: None   ACCESSION NUMBER(S): [id number]   ORDERING CLINICIAN: HERNANDO ANDRADE   TECHNIQUE: Axial images were obtained through the brain. No IV contrast was administered.   All CT examinations are performed with one or more of the following dose reduction techniques: Automated Exposure Control, adjustment of mA and/or kV according to patient size, or use of iterative reconstruction techniques.   FINDINGS: The ventricles are normal in size and midline in position. There is no  intracranial hemorrhage. No mass or mass effect is seen. The osseous structures are unremarkable.       Normal CT of the brain.   Signed by: Ciro Lopez 7/7/2024 12:56 PM Dictation workstation:   [id number]     I have personally reviewed the following imaging results CT cervical spine wo IV contrast    Result Date: 7/7/2024  Interpreted By:  Ciro Lopez, STUDY: CT CERVICAL SPINE WO IV CONTRAST; 7/7/2024 12:50 pm   INDICATION: Signs/Symptoms:seizure   COMPARISON: None.   ACCESSION NUMBER(S): [id number]   ORDERING CLINICIAN: HERNANDO ANDRADE   TECHNIQUE: Axial unenhanced images were obtained through the cervical spine. Sagittal and coronal post processing reconstruction images were obtained.   All CT examinations are performed with one or more of the following dose reduction techniques: Automated Exposure Control, adjustment of mA and/or kV according to patient size, or use of iterative reconstruction techniques.   FINDINGS: No fracture is seen. The vertebral body heights are maintained. The vertebral alignment is anatomic; this is confirmed on the sagittal reconstruction images. The odontoid process and the prevertebral soft tissues are within normal limits. The uncovertebral joints and facet joints are preserved. There is no spinal canal stenosis or  foraminal stenosis. Images from the thoracic inlet are unremarkable.       Normal CT of the cervical spine.   Signed by: Ciro Lopez 7/7/2024 12:58 PM Dictation workstation:   [id number]    CT head wo IV contrast    Result Date: 7/7/2024  Interpreted By:  Ciro Lopez, STUDY: CT HEAD WO IV CONTRAST; 7/7/2024 12:50 pm   INDICATION: Signs/Symptoms:Seizure R sided weakness   COMPARISON: None   ACCESSION NUMBER(S): [id number]   ORDERING CLINICIAN: HERNANDO ANDRADE   TECHNIQUE: Axial images were obtained through the brain. No IV contrast was administered.   All CT examinations are performed with one or more of the following dose reduction techniques: Automated Exposure Control, adjustment of mA and/or kV according to patient size, or use of iterative reconstruction techniques.   FINDINGS: The ventricles are normal in size and midline in position. There is no  intracranial hemorrhage. No mass or mass effect is seen. The osseous structures are unremarkable.       Normal CT of the brain.   Signed by: Ciro Lopez 7/7/2024 12:56 PM Dictation workstation:   [id number]    XR chest 1 view    Result Date: 6/29/2024  * * *Final Report* * * DATE OF EXAM: Jun 29 2024 10:39PM   AKX   [id number]  -  XR CHEST 1V FRONTAL   / ACCESSION #  [id number] PROCEDURE REASON: Chest pain      * * * * Physician Interpretation * * * *  EXAMINATION:  CHEST RADIOGRAPH (SINGLE VIEW AP OR PA) CLINICAL HISTORY: Chest pain MQ:  XC1_5 Comparison:  09/14/2023 RESULT: Lines, tubes, and devices:  None. Lungs and pleura:  No suspicious focal consolidation. No overt pulmonary edema.  No large effusion.  No pneumothorax.  Cardiomediastinal silhouette:  Normal cardiomediastinal silhouette. Other:  No acute skeletal finding.    IMPRESSION: No acute radiographic abnormality. : PSCSAURABH   Transcribe Date/Time: Jun 29 2024 11:04P Dictated by : RODNEY ALVAREZ MD This examination was interpreted and the report reviewed and electronically signed by:  RODNEY ALVAREZ MD on Jun 29 2024 11:05PM  EST  .      Assessment/Plan   Breakthrough seizures most likely due to noncompliance.  History of seizure disorder.  History of substance abuse.  History of chronic back pain    Plan.  Continue with Vimpat.  Patient had an EEG which did show focal spike-wave discharges more on the left than the right side with some episodes of generalized spike-wave discharges lasting less than seconds without any clinical seizures    I did review all the labs and urine was positive for amphetamine THC and benzodiazepine and patient denies using any drugs.    Okay to discharge on Vimpat 100 mg twice a day and Nayzilam 5 mg as needed for breakthrough seizures and patient can follow-up with her neurologist at [facility]    Seizure risk and precaution were discussed at great length and patient was advised not to drive till cleared by her neurologist    Due to technical limitations of voice recognition and human error, this note may not accurately reflect the care of the patient.                   En Collins MD

## 2024-07-08 NOTE — CARE PLAN
The patient's goals for the shift include      The clinical goals for the shift include patient will be seizure free through shift

## 2024-07-09 LAB
BACTERIA UR CULT: ABNORMAL
LACOSAMIDE SERPL-MCNC: <0.5 UG/ML (ref 1–10)

## 2024-07-10 LAB
1OH-MIDAZOLAM UR CFM-MCNC: >1000 NG/ML
7AMINOCLONAZEPAM UR CFM-MCNC: <25 NG/ML
A-OH ALPRAZ UR CFM-MCNC: <25 NG/ML
ALPRAZ UR CFM-MCNC: <25 NG/ML
CHLORDIAZEP UR CFM-MCNC: <25 NG/ML
CLONAZEPAM UR CFM-MCNC: <25 NG/ML
DIAZEPAM UR CFM-MCNC: <25 NG/ML
LORAZEPAM UR CFM-MCNC: <25 NG/ML
MIDAZOLAM UR CFM-MCNC: 39 NG/ML
NORDIAZEPAM UR CFM-MCNC: <25 NG/ML
OXAZEPAM UR CFM-MCNC: <25 NG/ML
TEMAZEPAM UR CFM-MCNC: <25 NG/ML

## 2024-07-11 LAB
AMPHET UR-MCNC: >5000 NG/ML
CARBOXYTHC UR-MCNC: 156 NG/ML
MDA UR-MCNC: <200 NG/ML
MDEA UR-MCNC: <200 NG/ML
MDMA UR-MCNC: <200 NG/ML
METHAMPHET UR-MCNC: NORMAL NG/ML
PHENTERMINE UR CFM-MCNC: <200 NG/ML

## 2024-09-17 ENCOUNTER — HOSPITAL ENCOUNTER (EMERGENCY)
Age: 24
Discharge: LEFT BEFORE DISCHARGE | End: 2024-09-17
Attending: EMERGENCY MEDICINE
Payer: COMMERCIAL

## 2024-09-17 VITALS
DIASTOLIC BLOOD PRESSURE: 87 MMHG | TEMPERATURE: 98.2 F | BODY MASS INDEX: 22.76 KG/M2 | SYSTOLIC BLOOD PRESSURE: 128 MMHG | WEIGHT: 145 LBS | HEART RATE: 100 BPM | HEIGHT: 67 IN | OXYGEN SATURATION: 97 % | RESPIRATION RATE: 18 BRPM

## 2024-09-17 DIAGNOSIS — G40.919 BREAKTHROUGH SEIZURE (HCC): Primary | ICD-10-CM

## 2024-09-17 DIAGNOSIS — S09.90XA INJURY OF HEAD, INITIAL ENCOUNTER: ICD-10-CM

## 2024-09-17 DIAGNOSIS — M54.2 NECK PAIN: ICD-10-CM

## 2024-09-17 LAB
ALBUMIN SERPL-MCNC: 4.4 G/DL (ref 3.5–5.2)
ALP SERPL-CCNC: 57 U/L (ref 35–104)
ALT SERPL-CCNC: 7 U/L (ref 0–32)
ANION GAP SERPL CALCULATED.3IONS-SCNC: 13 MMOL/L (ref 7–16)
AST SERPL-CCNC: 13 U/L (ref 0–31)
BASOPHILS # BLD: 0.07 K/UL (ref 0–0.2)
BASOPHILS NFR BLD: 1 % (ref 0–2)
BILIRUB SERPL-MCNC: 0.2 MG/DL (ref 0–1.2)
BUN SERPL-MCNC: 14 MG/DL (ref 6–20)
CALCIUM SERPL-MCNC: 9.3 MG/DL (ref 8.6–10.2)
CHLORIDE SERPL-SCNC: 103 MMOL/L (ref 98–107)
CO2 SERPL-SCNC: 21 MMOL/L (ref 22–29)
CREAT SERPL-MCNC: 0.7 MG/DL (ref 0.5–1)
EKG ATRIAL RATE: 95 BPM
EKG P AXIS: 23 DEGREES
EKG P-R INTERVAL: 140 MS
EKG Q-T INTERVAL: 330 MS
EKG QRS DURATION: 84 MS
EKG QTC CALCULATION (BAZETT): 414 MS
EKG R AXIS: 52 DEGREES
EKG T AXIS: 42 DEGREES
EKG VENTRICULAR RATE: 95 BPM
EOSINOPHIL # BLD: 0.12 K/UL (ref 0.05–0.5)
EOSINOPHILS RELATIVE PERCENT: 1 % (ref 0–6)
ERYTHROCYTE [DISTWIDTH] IN BLOOD BY AUTOMATED COUNT: 13.2 % (ref 11.5–15)
GFR, ESTIMATED: >90 ML/MIN/1.73M2
GLUCOSE SERPL-MCNC: 108 MG/DL (ref 74–99)
HCT VFR BLD AUTO: 39.1 % (ref 34–48)
HGB BLD-MCNC: 12.7 G/DL (ref 11.5–15.5)
IMM GRANULOCYTES # BLD AUTO: 0.09 K/UL (ref 0–0.58)
IMM GRANULOCYTES NFR BLD: 1 % (ref 0–5)
LYMPHOCYTES NFR BLD: 2.43 K/UL (ref 1.5–4)
LYMPHOCYTES RELATIVE PERCENT: 19 % (ref 20–42)
MCH RBC QN AUTO: 30 PG (ref 26–35)
MCHC RBC AUTO-ENTMCNC: 32.5 G/DL (ref 32–34.5)
MCV RBC AUTO: 92.2 FL (ref 80–99.9)
MONOCYTES NFR BLD: 0.54 K/UL (ref 0.1–0.95)
MONOCYTES NFR BLD: 4 % (ref 2–12)
NEUTROPHILS NFR BLD: 75 % (ref 43–80)
NEUTS SEG NFR BLD: 9.53 K/UL (ref 1.8–7.3)
PLATELET # BLD AUTO: 166 K/UL (ref 130–450)
PMV BLD AUTO: 12.8 FL (ref 7–12)
POTASSIUM SERPL-SCNC: 4.9 MMOL/L (ref 3.5–5)
PROT SERPL-MCNC: 7.2 G/DL (ref 6.4–8.3)
RBC # BLD AUTO: 4.24 M/UL (ref 3.5–5.5)
SODIUM SERPL-SCNC: 137 MMOL/L (ref 132–146)
WBC OTHER # BLD: 12.8 K/UL (ref 4.5–11.5)

## 2024-09-17 PROCEDURE — 85025 COMPLETE CBC W/AUTO DIFF WBC: CPT

## 2024-09-17 PROCEDURE — 80053 COMPREHEN METABOLIC PANEL: CPT

## 2024-09-17 PROCEDURE — 99284 EMERGENCY DEPT VISIT MOD MDM: CPT

## 2024-09-17 ASSESSMENT — LIFESTYLE VARIABLES: HOW OFTEN DO YOU HAVE A DRINK CONTAINING ALCOHOL: NEVER

## 2024-10-25 LAB
EKG ATRIAL RATE: 95 BPM
EKG P AXIS: 23 DEGREES
EKG P-R INTERVAL: 140 MS
EKG Q-T INTERVAL: 330 MS
EKG QRS DURATION: 84 MS
EKG QTC CALCULATION (BAZETT): 414 MS
EKG R AXIS: 52 DEGREES
EKG T AXIS: 42 DEGREES
EKG VENTRICULAR RATE: 95 BPM